# Patient Record
Sex: MALE | Race: WHITE | NOT HISPANIC OR LATINO | Employment: OTHER | URBAN - METROPOLITAN AREA
[De-identification: names, ages, dates, MRNs, and addresses within clinical notes are randomized per-mention and may not be internally consistent; named-entity substitution may affect disease eponyms.]

---

## 2019-01-02 ENCOUNTER — TELEPHONE (OUTPATIENT)
Dept: UROLOGY | Facility: CLINIC | Age: 74
End: 2019-01-02

## 2019-01-02 ENCOUNTER — OFFICE VISIT (OUTPATIENT)
Dept: UROLOGY | Facility: CLINIC | Age: 74
End: 2019-01-02
Payer: MEDICARE

## 2019-01-02 VITALS
SYSTOLIC BLOOD PRESSURE: 147 MMHG | WEIGHT: 207.25 LBS | DIASTOLIC BLOOD PRESSURE: 89 MMHG | HEART RATE: 76 BPM | BODY MASS INDEX: 27.47 KG/M2 | HEIGHT: 73 IN

## 2019-01-02 DIAGNOSIS — N13.8 BPH WITH URINARY OBSTRUCTION: Primary | ICD-10-CM

## 2019-01-02 DIAGNOSIS — N40.1 BPH WITH URINARY OBSTRUCTION: Primary | ICD-10-CM

## 2019-01-02 PROCEDURE — 99203 OFFICE O/P NEW LOW 30 MIN: CPT | Mod: PBBFAC | Performed by: UROLOGY

## 2019-01-02 PROCEDURE — 99204 OFFICE O/P NEW MOD 45 MIN: CPT | Mod: S$PBB,,, | Performed by: UROLOGY

## 2019-01-02 PROCEDURE — 99999 PR PBB SHADOW E&M-NEW PATIENT-LVL III: ICD-10-PCS | Mod: PBBFAC,,, | Performed by: UROLOGY

## 2019-01-02 PROCEDURE — 99204 PR OFFICE/OUTPT VISIT, NEW, LEVL IV, 45-59 MIN: ICD-10-PCS | Mod: S$PBB,,, | Performed by: UROLOGY

## 2019-01-02 PROCEDURE — 99999 PR PBB SHADOW E&M-NEW PATIENT-LVL III: CPT | Mod: PBBFAC,,, | Performed by: UROLOGY

## 2019-01-02 RX ORDER — WARFARIN SODIUM 5 MG/1
5 TABLET ORAL DAILY
Refills: 11 | COMMUNITY
Start: 2018-12-28

## 2019-01-02 RX ORDER — POTASSIUM CHLORIDE 600 MG/1
8 CAPSULE, EXTENDED RELEASE ORAL DAILY
Refills: 3 | COMMUNITY
Start: 2018-12-06

## 2019-01-02 RX ORDER — AMLODIPINE BESYLATE 2.5 MG/1
TABLET ORAL
Refills: 11 | Status: ON HOLD | COMMUNITY
Start: 2018-12-06 | End: 2019-07-16 | Stop reason: CLARIF

## 2019-01-02 RX ORDER — LIDOCAINE HYDROCHLORIDE 20 MG/ML
JELLY TOPICAL ONCE
Status: CANCELLED | OUTPATIENT
Start: 2019-01-02 | End: 2019-01-02

## 2019-01-02 RX ORDER — ASPIRIN 81 MG/1
81 TABLET ORAL DAILY
COMMUNITY

## 2019-01-02 RX ORDER — HYDRALAZINE HYDROCHLORIDE 25 MG/1
25 TABLET, FILM COATED ORAL EVERY 8 HOURS
Refills: 11 | COMMUNITY
Start: 2018-12-24 | End: 2019-10-21

## 2019-01-02 RX ORDER — DOCUSATE SODIUM 100 MG/1
100 CAPSULE, LIQUID FILLED ORAL 2 TIMES DAILY
COMMUNITY

## 2019-01-02 RX ORDER — FINASTERIDE 5 MG/1
TABLET, FILM COATED ORAL
Refills: 6 | COMMUNITY
Start: 2018-12-24 | End: 2019-07-02 | Stop reason: CLARIF

## 2019-01-02 RX ORDER — ATORVASTATIN CALCIUM 10 MG/1
10 TABLET, FILM COATED ORAL NIGHTLY
COMMUNITY

## 2019-01-02 RX ORDER — FUROSEMIDE 20 MG/1
TABLET ORAL
Refills: 11 | COMMUNITY
Start: 2018-12-03

## 2019-01-02 RX ORDER — ASPIRIN 325 MG
100 TABLET, DELAYED RELEASE (ENTERIC COATED) ORAL DAILY
COMMUNITY

## 2019-01-02 RX ORDER — WARFARIN 2.5 MG/1
7.5 TABLET ORAL
Refills: 11 | COMMUNITY
Start: 2018-12-28 | End: 2019-07-02 | Stop reason: CLARIF

## 2019-01-02 RX ORDER — TAMSULOSIN HYDROCHLORIDE 0.4 MG/1
0.4 CAPSULE ORAL NIGHTLY
Refills: 6 | COMMUNITY
Start: 2018-12-11

## 2019-01-02 NOTE — H&P (VIEW-ONLY)
CHIEF COMPLAINT:    Mr. Luna is a 73 y.o. male presenting with LUTS.    PRESENTING ILLNESS:    Lakeshia Luna is a 73 y.o. male who c/o LUTS. He has nocturia x 3-5, + straining, + decreased FOS, + intermittency, + hesitancy, + frequency.  He's on flomax and proscar.  His outside urologist recommended a TURP, but he'd like rezum.    He also c/o ED.  This has been present for > 1 year.  He's tried and failed oral meds, a MATHEW, and ICI.  He'd like his LUTS treated before considering IPP.    REVIEW OF SYSTEMS:    Lakeshia Luna denies headache, blurred vision, fever, nausea, vomiting, chills, abdominal pain, bleeding per rectum, cough, SOB, recent loss of consciousness, recent mental status changes, seizures, dizziness, or upper or lower extremity weakness.    KATIE  1. 1  2. 1  3. 1  4. 1  5. 1      PATIENT HISTORY:    Past Medical History:   Diagnosis Date    BPH with urinary obstruction     Hyperlipemia     Hypertension        Past Surgical History:   Procedure Laterality Date    BACK SURGERY      CARDIAC PACEMAKER PLACEMENT      HERNIA REPAIR      NECK SURGERY      VASECTOMY         History reviewed. No pertinent family history.    Social History     Socioeconomic History    Marital status:      Spouse name: Not on file    Number of children: Not on file    Years of education: Not on file    Highest education level: Not on file   Social Needs    Financial resource strain: Not on file    Food insecurity - worry: Not on file    Food insecurity - inability: Not on file    Transportation needs - medical: Not on file    Transportation needs - non-medical: Not on file   Occupational History    Not on file   Tobacco Use    Smoking status: Former Smoker     Types: Cigarettes    Smokeless tobacco: Former User   Substance and Sexual Activity    Alcohol use: Yes    Drug use: No    Sexual activity: Not on file   Other Topics Concern    Not on file   Social History Narrative    Not on file        Allergies:  Ace inhibitors and Carvedilol    Medications:    Current Outpatient Medications:     amLODIPine (NORVASC) 2.5 MG tablet, , Disp: , Rfl: 11    aspirin (ECOTRIN) 81 MG EC tablet, Take 81 mg by mouth once daily., Disp: , Rfl:     atorvastatin (LIPITOR) 10 MG tablet, Take 10 mg by mouth once daily., Disp: , Rfl:     co-enzyme Q-10 50 mg capsule, Take 100 mg by mouth once daily., Disp: , Rfl:     docusate sodium (COLACE) 100 MG capsule, Take 100 mg by mouth 2 (two) times daily., Disp: , Rfl:     finasteride (PROSCAR) 5 mg tablet, , Disp: , Rfl: 6    furosemide (LASIX) 20 MG tablet, , Disp: , Rfl: 11    hydrALAZINE (APRESOLINE) 25 MG tablet, , Disp: , Rfl: 11    potassium chloride (MICRO-K) 8 mEq CpSR, , Disp: , Rfl: 3    tamsulosin (FLOMAX) 0.4 mg Cap, , Disp: , Rfl: 6    warfarin (COUMADIN) 2.5 MG tablet, 7.5 mg every Mon, Wed, Fri. , Disp: , Rfl: 11    warfarin (COUMADIN) 5 MG tablet, , Disp: , Rfl: 11    PHYSICAL EXAMINATION:    The patient generally appears in good health, is appropriately interactive, and is in no apparent distress.     Eyes: anicteric sclerae, moist conjunctivae; no lid-lag; PERRLA     HENT: Atraumatic; oropharynx clear with moist mucous membranes and no mucosal ulcerations;normal hard and soft palate.  No evidence of lymphadenopathy.    Neck: Trachea midline.  No thyromegaly.    Musculoskeletal: No abnormal gait.    Skin: No lesions.    Mental: Cooperative with normal affect.  Is oriented to time, place, and person.    Neuro: Grossly intact.    Chest: Normal inspiratory effort.   No accessory muscles.  No audible wheezes.  Respirations symmetric on inspiration and expiration.    Heart: Regular rhythm.      Abdomen:  Soft, non-tender. No masses or organomegaly. Bladder is not palpable. No evidence of flank discomfort. No evidence of inguinal hernia.    Genitourinary: The penis is not circumcised with no evidence of plaques or induration. The urethral meatus is normal.  The testes, epididymides, and cord structures are normal in size and contour bilaterally. The scrotum is normal in size and contour.    Normal anal sphincter tone. No rectal mass.    The prostate is 40 g. Normal landmarks. Lateral sulci. Median furrow intact.  No nodularity or induration. Seminal vesicles are normal.    Extremities: No clubbing, cyanosis, or edema      LABS:    UA dipped negative today  No results found for: PSA, PSADIAG, PSATOTAL, PSAFREE, PSAFREEPCT    IMPRESSION:    Encounter Diagnoses   Name Primary?    BPH with urinary obstruction Yes         PLAN:    1. Discussed risks/benefits of Rezum.  Discussed bleeding, frequency, failure amongst other risks.  Also discussed very small risk of EjD and ED.  He was given the chance to ask questions.  Alternatives discussed.  Will schedule for Rezum.  2. Will cysto to make sure he's a candidate.  3. He'd like his ED worked up after the Rezum.    Copy to:

## 2019-01-03 ENCOUNTER — HOSPITAL ENCOUNTER (OUTPATIENT)
Dept: UROLOGY | Facility: HOSPITAL | Age: 74
Discharge: HOME | End: 2019-01-03
Attending: UROLOGY
Payer: MEDICARE

## 2019-01-03 VITALS
SYSTOLIC BLOOD PRESSURE: 172 MMHG | DIASTOLIC BLOOD PRESSURE: 80 MMHG | TEMPERATURE: 98 F | RESPIRATION RATE: 18 BRPM | WEIGHT: 211 LBS | HEIGHT: 73 IN | BODY MASS INDEX: 27.96 KG/M2 | HEART RATE: 75 BPM

## 2019-01-03 DIAGNOSIS — N13.8 BPH WITH URINARY OBSTRUCTION: ICD-10-CM

## 2019-01-03 DIAGNOSIS — N40.1 BPH WITH URINARY OBSTRUCTION: ICD-10-CM

## 2019-01-03 PROCEDURE — 52000 PR CYSTOURETHROSCOPY: ICD-10-PCS | Mod: ,,, | Performed by: UROLOGY

## 2019-01-03 PROCEDURE — 52000 CYSTOURETHROSCOPY: CPT

## 2019-01-03 PROCEDURE — 52000 CYSTOURETHROSCOPY: CPT | Mod: ,,, | Performed by: UROLOGY

## 2019-01-03 RX ORDER — LIDOCAINE HYDROCHLORIDE 20 MG/ML
JELLY TOPICAL ONCE
Status: COMPLETED | OUTPATIENT
Start: 2019-01-03 | End: 2019-01-03

## 2019-01-03 RX ADMIN — LIDOCAINE HYDROCHLORIDE: 20 JELLY TOPICAL at 01:01

## 2019-01-03 NOTE — PATIENT INSTRUCTIONS
What to Expect After a Cystoscopy  For the next 24-48 hours, you may feel a mild burning when you urinate. This burning is normal and expected. Drink plenty of water to dilute the urine to help relieve the burning sensation. You may also see a small amount of blood in your urine after the procedure.    Unless you are already taking antibiotics, you may be given an antibiotic after the test to prevent infection.    Signs and Symptoms to Report  Call the [facility] at [phone] if you develop any of the following:  · Fever of 101 degrees or higher  · Chills or persistent bleeding  · Inability to urinate

## 2019-01-03 NOTE — PROCEDURES
Date: 01/03/2019     Surgeon: Neville    Assistant: None    Procedure performed: cystoscopy    Blood loss: None    Specimen: None    Procedure in detail: Using standard sterile technique, the flexible cystoscope was assembled and passed into the patient's bladder.  Cystoscopic evaluation of the bladder revealed tri-lobar hypertrophy.  The estimated prostatic length was 4.5 cm.

## 2019-01-04 ENCOUNTER — ANESTHESIA EVENT (OUTPATIENT)
Dept: SURGERY | Facility: HOSPITAL | Age: 74
End: 2019-01-04
Payer: MEDICARE

## 2019-01-04 NOTE — PRE ADMISSION SCREENING
Anesthesia Assessment: Preoperative EQUATION    Planned Procedure: Procedure(s) (LRB):  DESTRUCTION, PROSTATE, TRANSURETHRAL (N/A)  Requested Anesthesia Type:Monitor Anesthesia Care  Surgeon: Fermin Lozada MD  Service: Urology  Known or anticipated Date of Surgery:1/22/2019    Surgeon notes: reviewed    Electronic QUestionnaire Assessment completed via nurse interview with patient.      NO AQ    Triage considerations:     The patient has no apparent active cardiac condition (No unstable coronary Syndrome such as severe unstable angina or recent [<1 month] myocardial infarction, decompensated CHF, severe valvular   disease or significant arrhythmia)    Previous anesthesia records:No problems and Not available   Has previously had ACDF 2015 and pacemaker with stents 8/5/2013    Last PCP note: outside [facility] , unknown, requesting records from [facility] PCP Dr. Mela Carter  Subspecialty notes: Cardiology: EP and General, Neurology (requesting Card records from [facility] Dr. Rios)    Other important co-morbidities: BPH, HTN/HLP, NIMA      Tests already available:  to be determined Requesting records from [facility] PCP and Card            Instructions given. (See in Nurse's note)    Optimization:  Anesthesia Preop Clinic Assessment  Indicated: Not required for this procedure.    Medical Opinion Indicated.  Requesting Card Clear from Dr. Rios      Plan:    Testing:  BMP and EKG (will order AM of Sx if not received; Pt lives out of town)     Consultation:Optimization request: TBCB OS Dr. Rios     Patient  has previously scheduled Medical Appointment: 4/17/2019 Post Op    Navigation: Tests Scheduled. (AM of surgery if not received)             Consults scheduled. (TBCB OS Dr. Rios)             Results will be tracked by Preop Clinic.

## 2019-01-04 NOTE — ANESTHESIA PREPROCEDURE EVALUATION
Bailey Blum, RN   Registered Nurse      Pre Admission Screening   Signed                     Anesthesia Assessment: Preoperative EQUATION     Planned Procedure: Procedure(s) (LRB):  DESTRUCTION, PROSTATE, TRANSURETHRAL (N/A)  Requested Anesthesia Type:Monitor Anesthesia Care  Surgeon: Fermin Lozada MD  Service: Urology  Known or anticipated Date of Surgery:1/22/2019     Surgeon notes: reviewed     Electronic QUestionnaire Assessment completed via nurse interview with patient.      NO AQ     Triage considerations:      The patient has no apparent active cardiac condition (No unstable coronary Syndrome such as severe unstable angina or recent [<1 month] myocardial infarction, decompensated CHF, severe valvular   disease or significant arrhythmia)     Previous anesthesia records:No problems and Not available   Has previously had ACDF 2015 and pacemaker with stents 8/5/2013     Last PCP note: outside [facility] , unknown, requesting records from [facility] PCP Dr. Mela Carter  Subspecialty notes: Cardiology: EP and General, Neurology (requesting Card records from [facility] Dr. Rios)     Other important co-morbidities: BPH, HTN/HLP, NIMA      Tests already available:  to be determined Requesting records from [facility] PCP and Card                            Instructions given. (See in Nurse's note)     Optimization:  Anesthesia Preop Clinic Assessment  Indicated: Not required for this procedure.    Medical Opinion Indicated.  Requesting Card Clear from Dr. Rios                Plan:    Testing:  BMP and EKG (will order AM of Sx if not received; Pt lives out of town)                           Consultation:Optimization request: TBCB OS Dr. Rios                           Patient  has previously scheduled Medical Appointment: 4/17/2019 Post Op     Navigation: Tests Scheduled. (AM of surgery if not received)                        Consults scheduled. (TBCB OS Dr. Rios)                        Results will be tracked by Preop  Clinic.          1/7/2019 Records received from Dr. Rios Cardiology, Visit note (10/11/2018), Echo (10/2/2018), EKG (8/21/2018), Stress Test (2/21/2018), Heart Cath diagram and note (3/2018).  Results noted and scanned to media.    1/9/2019  Received Cardiac Clearance note, scanned to media.  Waiting on Coumadin and ASA instructions.    1/15/2019  Surgery date changed to 1/22/2019.  Received ASA instructions from [facility] (scanned to media and pt verbally notified)  Medical Clearance and [facility] records (visit 1/8/2019) received from Dr. Carter. (scanned to media)  Received surgical clearance form from Dr. Goode with Pacemaker instructions.  (scanned to media) Still awaiting Coumadin Instructions.    BMP ordered for AM of surgery.    1/16/2019  OK to hold coumadin 5 days prior to surgery per Dr. Goode. Scanned to media                                                                                                                 01/04/2019  Lakeshia Luna is a 73 y.o., male.    Anesthesia Evaluation    I have reviewed the Patient Summary Reports.    I have reviewed the Nursing Notes.   I have reviewed the Medications.     Review of Systems  Anesthesia Hx:  History of prior surgery of interest to airway management or planning: Previous anesthesia: General 10/2015 ACDF with general anesthesia.  Denies Family Hx of Anesthesia complications.   Denies Personal Hx of Anesthesia complications.   Social:  Former Smoker, Social Alcohol Use Quit 2013   Hematology/Oncology:  Hematology Normal   Oncology Normal     EENT/Dental:  EENT/Dental Normal Denies Active Dental Problems  Denies Jaw Problems   Cardiovascular:   Hypertension Dysrhythmias atrial fibrillation Denies Angina. hyperlipidemia  Functional Capacity good / => 4 METS  Coronary Artery Disease:  hx of Percutaneous Coronary Intervention (PCI). S/P Percutaneous Coronary Intervention (PCI) coronary stent, unknown type, currently on aspirin, currently on other  antiplatelet Rx.  Hypertension , Well Controlled on Rx , Recent typical home B/P of 136/75 Disorder of Cardiac Rhythm, Atrial Fibrillation (Pacemaker 8/5/2013, paced at 75bpm), on warfarin Rx    Pulmonary:   Denies Asthma.  Denies Shortness of breath.  Denies Recent URI.  Obstructive Sleep Apnea (NIMA), CPAP used, CPAP prescribed.   Renal/:   BPH    Hepatic/GI:  Hepatic/GI Normal    Musculoskeletal:  Cervical Spine Disorder, S/P Cervical Fusion (ACDF 10/2015, Denies limited ROM)    Neurological:  Neurology Normal    Endocrine:  Endocrine Normal    Dermatological:  Skin Normal    Psych:  Psychiatric Normal           Physical Exam  General:  Well nourished    Airway/Jaw/Neck:  Airway Findings: Mouth Opening: Normal Tongue: Normal  General Airway Assessment: Adult  Mallampati: II  TM Distance: Normal, at least 6 cm  Jaw/Neck Findings:  Neck ROM: Normal ROM      Dental:  Dental Findings: In tact   Chest/Lungs:  Chest/Lungs Findings: Clear to auscultation, Normal Respiratory Rate     Heart/Vascular:  Heart Findings: Rate: Normal  Rhythm: Regular Rhythm  Sounds: Normal        Mental Status:  Mental Status Findings:  Cooperative, Alert and Oriented         Anesthesia Plan  Type of Anesthesia, risks & benefits discussed:  Anesthesia Type:  general, MAC  Patient's Preference:   Intra-op Monitoring Plan: standard ASA monitors  Intra-op Monitoring Plan Comments:   Post Op Pain Control Plan: multimodal analgesia  Post Op Pain Control Plan Comments:   Induction:   IV  Beta Blocker:  Patient is not currently on a Beta-Blocker (No further documentation required).       Informed Consent: Patient understands risks and agrees with Anesthesia plan.  Questions answered. Anesthesia consent signed with patient.  ASA Score: 2     Day of Surgery Review of History & Physical:    H&P update referred to the surgeon.         Ready For Surgery From Anesthesia Perspective.

## 2019-01-11 ENCOUNTER — TELEPHONE (OUTPATIENT)
Dept: UROLOGY | Facility: CLINIC | Age: 74
End: 2019-01-11

## 2019-01-15 DIAGNOSIS — Z01.818 PREOPERATIVE TESTING: Primary | ICD-10-CM

## 2019-01-21 ENCOUNTER — TELEPHONE (OUTPATIENT)
Dept: UROLOGY | Facility: CLINIC | Age: 74
End: 2019-01-21

## 2019-01-21 NOTE — TELEPHONE ENCOUNTER
Called pt to confirm arrival time 1000am for procedure. Gave pt NPO instructions and gave pt opportunity to ask questions. Pt verbalized understanding.

## 2019-01-22 ENCOUNTER — ANESTHESIA (OUTPATIENT)
Dept: SURGERY | Facility: HOSPITAL | Age: 74
End: 2019-01-22
Payer: MEDICARE

## 2019-01-22 ENCOUNTER — HOSPITAL ENCOUNTER (OUTPATIENT)
Facility: HOSPITAL | Age: 74
Discharge: HOME | End: 2019-01-22
Attending: UROLOGY | Admitting: UROLOGY
Payer: MEDICARE

## 2019-01-22 VITALS
TEMPERATURE: 98 F | RESPIRATION RATE: 16 BRPM | OXYGEN SATURATION: 100 % | BODY MASS INDEX: 26.64 KG/M2 | HEART RATE: 75 BPM | DIASTOLIC BLOOD PRESSURE: 93 MMHG | HEIGHT: 73 IN | WEIGHT: 201 LBS | SYSTOLIC BLOOD PRESSURE: 158 MMHG

## 2019-01-22 DIAGNOSIS — Z01.818 PREOPERATIVE TESTING: ICD-10-CM

## 2019-01-22 DIAGNOSIS — N40.0 BPH (BENIGN PROSTATIC HYPERPLASIA): Primary | ICD-10-CM

## 2019-01-22 LAB
ANION GAP SERPL CALC-SCNC: 8 MMOL/L
BUN SERPL-MCNC: 13 MG/DL
CALCIUM SERPL-MCNC: 9.7 MG/DL
CHLORIDE SERPL-SCNC: 102 MMOL/L
CO2 SERPL-SCNC: 30 MMOL/L
CREAT SERPL-MCNC: 1 MG/DL
EST. GFR  (AFRICAN AMERICAN): >60 ML/MIN/1.73 M^2
EST. GFR  (NON AFRICAN AMERICAN): >60 ML/MIN/1.73 M^2
GLUCOSE SERPL-MCNC: 104 MG/DL
POTASSIUM SERPL-SCNC: 3.8 MMOL/L
SODIUM SERPL-SCNC: 140 MMOL/L

## 2019-01-22 PROCEDURE — 71000015 HC POSTOP RECOV 1ST HR: Performed by: UROLOGY

## 2019-01-22 PROCEDURE — 37000008 HC ANESTHESIA 1ST 15 MINUTES: Performed by: UROLOGY

## 2019-01-22 PROCEDURE — 36000707: Performed by: UROLOGY

## 2019-01-22 PROCEDURE — 63600175 PHARM REV CODE 636 W HCPCS: Performed by: NURSE ANESTHETIST, CERTIFIED REGISTERED

## 2019-01-22 PROCEDURE — 25000003 PHARM REV CODE 250: Performed by: UROLOGY

## 2019-01-22 PROCEDURE — 25000003 PHARM REV CODE 250: Performed by: NURSE ANESTHETIST, CERTIFIED REGISTERED

## 2019-01-22 PROCEDURE — 37000009 HC ANESTHESIA EA ADD 15 MINS: Performed by: UROLOGY

## 2019-01-22 PROCEDURE — D9220A PRA ANESTHESIA: Mod: ANES,,, | Performed by: ANESTHESIOLOGY

## 2019-01-22 PROCEDURE — 36000706: Performed by: UROLOGY

## 2019-01-22 PROCEDURE — D9220A PRA ANESTHESIA: ICD-10-PCS | Mod: ANES,,, | Performed by: ANESTHESIOLOGY

## 2019-01-22 PROCEDURE — D9220A PRA ANESTHESIA: ICD-10-PCS | Mod: CRNA,,, | Performed by: NURSE ANESTHETIST, CERTIFIED REGISTERED

## 2019-01-22 PROCEDURE — 63600175 PHARM REV CODE 636 W HCPCS: Performed by: STUDENT IN AN ORGANIZED HEALTH CARE EDUCATION/TRAINING PROGRAM

## 2019-01-22 PROCEDURE — 80048 BASIC METABOLIC PNL TOTAL CA: CPT

## 2019-01-22 PROCEDURE — 53852 PR PROSTATIC RADIOFREQ THERMOTX: ICD-10-PCS | Mod: GC,,, | Performed by: UROLOGY

## 2019-01-22 PROCEDURE — 71000044 HC DOSC ROUTINE RECOVERY FIRST HOUR: Performed by: UROLOGY

## 2019-01-22 PROCEDURE — 53852 PROSTATIC RF THERMOTX: CPT | Mod: GC,,, | Performed by: UROLOGY

## 2019-01-22 PROCEDURE — D9220A PRA ANESTHESIA: Mod: CRNA,,, | Performed by: NURSE ANESTHETIST, CERTIFIED REGISTERED

## 2019-01-22 RX ORDER — LIDOCAINE HYDROCHLORIDE 20 MG/ML
JELLY TOPICAL
Status: DISCONTINUED | OUTPATIENT
Start: 2019-01-22 | End: 2019-01-22 | Stop reason: HOSPADM

## 2019-01-22 RX ORDER — CEFAZOLIN SODIUM 1 G/3ML
2 INJECTION, POWDER, FOR SOLUTION INTRAMUSCULAR; INTRAVENOUS
Status: COMPLETED | OUTPATIENT
Start: 2019-01-22 | End: 2019-01-22

## 2019-01-22 RX ORDER — PROPOFOL 10 MG/ML
VIAL (ML) INTRAVENOUS
Status: DISCONTINUED | OUTPATIENT
Start: 2019-01-22 | End: 2019-01-22

## 2019-01-22 RX ORDER — SODIUM CHLORIDE 0.9 % (FLUSH) 0.9 %
3 SYRINGE (ML) INJECTION
Status: DISCONTINUED | OUTPATIENT
Start: 2019-01-22 | End: 2019-01-22 | Stop reason: HOSPADM

## 2019-01-22 RX ORDER — IBUPROFEN 400 MG/1
400 TABLET ORAL EVERY 6 HOURS PRN
Qty: 48 TABLET | Refills: 0 | Status: SHIPPED | OUTPATIENT
Start: 2019-01-22 | End: 2019-02-05

## 2019-01-22 RX ORDER — MIDAZOLAM HYDROCHLORIDE 1 MG/ML
INJECTION, SOLUTION INTRAMUSCULAR; INTRAVENOUS
Status: DISCONTINUED | OUTPATIENT
Start: 2019-01-22 | End: 2019-01-22

## 2019-01-22 RX ORDER — ONDANSETRON 2 MG/ML
4 INJECTION INTRAMUSCULAR; INTRAVENOUS DAILY PRN
Status: DISCONTINUED | OUTPATIENT
Start: 2019-01-22 | End: 2019-01-22 | Stop reason: HOSPADM

## 2019-01-22 RX ORDER — HYDROCODONE BITARTRATE AND ACETAMINOPHEN 5; 325 MG/1; MG/1
1 TABLET ORAL EVERY 6 HOURS PRN
Qty: 11 TABLET | Refills: 0 | Status: SHIPPED | OUTPATIENT
Start: 2019-01-22 | End: 2019-02-01

## 2019-01-22 RX ORDER — PHENAZOPYRIDINE HYDROCHLORIDE 100 MG/1
100 TABLET, FILM COATED ORAL 3 TIMES DAILY PRN
Qty: 21 TABLET | Refills: 0 | Status: SHIPPED | OUTPATIENT
Start: 2019-01-22 | End: 2019-02-01

## 2019-01-22 RX ORDER — SULFAMETHOXAZOLE AND TRIMETHOPRIM 800; 160 MG/1; MG/1
1 TABLET ORAL 2 TIMES DAILY
Qty: 14 TABLET | Refills: 0 | Status: SHIPPED | OUTPATIENT
Start: 2019-01-22 | End: 2019-01-29

## 2019-01-22 RX ORDER — FENTANYL CITRATE 50 UG/ML
25 INJECTION, SOLUTION INTRAMUSCULAR; INTRAVENOUS EVERY 5 MIN PRN
Status: DISCONTINUED | OUTPATIENT
Start: 2019-01-22 | End: 2019-01-22 | Stop reason: HOSPADM

## 2019-01-22 RX ORDER — OXYBUTYNIN CHLORIDE 5 MG/1
5 TABLET ORAL 3 TIMES DAILY PRN
Qty: 21 TABLET | Refills: 0 | Status: SHIPPED | OUTPATIENT
Start: 2019-01-22 | End: 2019-07-02 | Stop reason: CLARIF

## 2019-01-22 RX ORDER — SODIUM CHLORIDE 9 MG/ML
INJECTION, SOLUTION INTRAVENOUS CONTINUOUS PRN
Status: DISCONTINUED | OUTPATIENT
Start: 2019-01-22 | End: 2019-01-22

## 2019-01-22 RX ORDER — LIDOCAINE HCL/PF 100 MG/5ML
SYRINGE (ML) INTRAVENOUS
Status: DISCONTINUED | OUTPATIENT
Start: 2019-01-22 | End: 2019-01-22

## 2019-01-22 RX ADMIN — PROPOFOL 20 MG: 10 INJECTION, EMULSION INTRAVENOUS at 11:01

## 2019-01-22 RX ADMIN — PROPOFOL 150 MG: 10 INJECTION, EMULSION INTRAVENOUS at 11:01

## 2019-01-22 RX ADMIN — MIDAZOLAM HYDROCHLORIDE 1 MG: 1 INJECTION, SOLUTION INTRAMUSCULAR; INTRAVENOUS at 10:01

## 2019-01-22 RX ADMIN — SODIUM CHLORIDE: 0.9 INJECTION, SOLUTION INTRAVENOUS at 10:01

## 2019-01-22 RX ADMIN — LIDOCAINE HYDROCHLORIDE 100 MG: 20 INJECTION, SOLUTION INTRAVENOUS at 11:01

## 2019-01-22 RX ADMIN — CEFAZOLIN 2 G: 330 INJECTION, POWDER, FOR SOLUTION INTRAMUSCULAR; INTRAVENOUS at 11:01

## 2019-01-22 RX ADMIN — PROPOFOL 30 MG: 10 INJECTION, EMULSION INTRAVENOUS at 11:01

## 2019-01-22 NOTE — DISCHARGE INSTRUCTIONS
Please continue taking your flomax.   May restart your aspirin tomorrow (1/23)  May restart your coumadin on Thursday (1/24) if urine is not bloody.   Will have you follow up in 1 week for catheter removal.         Resendez Catheter Care    A Resendez catheter is a rubber tube that is placed through the urethra (opening where urine comes out) and into the bladder. This helps drain urine from the bladder. There is a small balloon on the end of the tube that is inflated after insertion. This keeps the catheter from sliding out of the bladder.  A Resendez catheter is used to treat urinary retention (unable to pass urine). It is also used when there is incontinence (loss of bladder control).  Home care  · Finish taking any prescribed antibiotic even if you are feeling better before then.  · It is important to keep bacteria from getting into the collection bag. Do not disconnect the catheter from the collection bag.  · Use a leg band to secure the drainage tube, so it does not pull on the catheter. Drain the collection bag when it becomes full using the drain spout at the bottom of the bag.  · Do not try to pull or remove your catheter. This will injure your urethra. It must be removed by your healthcare provider or nurse.  Follow-up care  Follow up with your healthcare provider as advised for repeat urine testing and catheter removal or replacement.  When to seek medical advice  Call your healthcare provider right away if any of these occur:  · Fever of 100.4ºF (38ºC) or higher, or as directed by your healthcare provider  · Bladder pain or fullness  · Abdominal swelling, nausea or vomiting, or back pain  · Blood or urine leakage around the catheter  · Bloody urine coming from the catheter (if a new symptom)  · Catheter falls out  · Catheter stops draining for 6 hours  · Weakness, dizziness, or fainting  Date Last Reviewed: 10/1/2016  © 1175-6407 [employer]. [address] [place] [address], [place] [address]. All rights  reserved. This information is not intended as a substitute for professional medical care. Always follow your healthcare professional's instructions.

## 2019-01-22 NOTE — OP NOTE
[facility]  Operative Note     Date: 01/22/2019     Pre-Op Diagnosis: BPH with obstructive urinary symptoms    Patient Active Problem List   Diagnosis    BPH (benign prostatic hyperplasia)    Hypertension    Hyperlipemia    Atrial fibrillation        Post-Op Diagnosis: same     Procedure(s) Performed:   1. Transurethral destruction of prostate; radiofrequency thermotherapy      Specimen(s): none     Staff Surgeon: Fermin Lozada MD     Assistant Surgeon: Matthieu Allison MD     Anesthesia:  General LMA anesthesia     Indications: Lakeshia Luna is a 74 y.o. male with BPH and LUTS presenting for surgical intervention.         Findings:    1. Trilobar hyperplasia  2.  6 total treatments, 2 in each lateral lobe, 2 in median lobe     Estimated Blood Loss: minimal     Drains:   1.  18 Fr  catheter     Procedure in detail: After informed consent was obtained and all questions were answered, the patient was brought to the operating suite and placed in the lithotomy position. General anesthesia was administered. SCDs were applied and working prior to induction of anesthesia. That patient was then prepped and draped in the usual sterile fashion. Time out was performed and preoperative antibiotics were confirmed.       We began the case by inserting the Rezum scope into the bladder. No urethral strictures were seen and scope entered easily.     Next, we examined the prostate and found trilobar hypertrophy. RF was then used to create thermal energy to selectively ablate prostate tissue 1 cm from the bladder neck to the proximal end of the veru spaced 1 cm apart.      6 total treatments were given. Specifically  2 in each lateral lobe at 3 and 6 o'clock positions and 2 in his median lobe. There was concern for vapor leak on our first treatment on the left portion of the median lobe and we decided to perform another treatment in this area.      At the completion of the procedure the device was removed. There  was a careful check that there were no clots in the bladder or injury to the bladder, prostate or urethra.      18 fr  catheter was placed with 10 mL of sterile water in the balloon     The patient tolerated the procedure well and was transferred to the PACU in stable condition.       Disposition:  The patient will be discharged home with 5 days of antibiotic therapy, 1 week of pyridium, 1 week of ditropan, 2 weeks of NSAIDS, and pain medications. He will follow up with Dr. Lozada in clinic in 7 days to have his  catheter removed.

## 2019-01-22 NOTE — DISCHARGE SUMMARY
[facility]  Discharge Note  Short Stay    Admit Date: 1/22/2019    Discharge Date and Time: 01/22/2019 11:38 AM      Attending Physician: Fermin Lozada MD     Discharge Provider: Matthieu Allison MD    Diagnoses:  Active Hospital Problems    Diagnosis  POA    *BPH (benign prostatic hyperplasia) [N40.0]  Yes    Hypertension [I10]  Yes    Hyperlipemia [E78.5]  Yes    Atrial fibrillation [I48.91]  Yes      Resolved Hospital Problems   No resolved problems to display.       Discharged Condition: good    Hospital Course: Patient was admitted for Rezum and tolerated the procedure well with no complications. The patient was discharged home in good condition on the same day.       Final Diagnoses: Same as principal problem.    Disposition: Home or Self Care    Follow up/Patient Instructions:    Medications:  Reconciled Home Medications:   Current Discharge Medication List      START taking these medications    Details   HYDROcodone-acetaminophen (NORCO) 5-325 mg per tablet Take 1 tablet by mouth every 6 (six) hours as needed for Pain.  Qty: 11 tablet, Refills: 0      ibuprofen (ADVIL,MOTRIN) 400 MG tablet Take 1 tablet (400 mg total) by mouth every 6 (six) hours as needed (pain and inflammation).  Qty: 48 tablet, Refills: 0      oxybutynin (DITROPAN) 5 MG Tab Take 1 tablet (5 mg total) by mouth 3 (three) times daily as needed (bladder spasms).  Qty: 21 tablet, Refills: 0      phenazopyridine (PYRIDIUM) 100 MG tablet Take 1 tablet (100 mg total) by mouth 3 (three) times daily as needed for Pain.  Qty: 21 tablet, Refills: 0      sulfamethoxazole-trimethoprim 800-160mg (BACTRIM DS) 800-160 mg Tab Take 1 tablet by mouth 2 (two) times daily. for 7 days  Qty: 14 tablet, Refills: 0         CONTINUE these medications which have NOT CHANGED    Details   amLODIPine (NORVASC) 2.5 MG tablet Refills: 11      aspirin (ECOTRIN) 81 MG EC tablet Take 81 mg by mouth once daily.      atorvastatin (LIPITOR) 10 MG tablet Take 10  mg by mouth once daily.      co-enzyme Q-10 50 mg capsule Take 100 mg by mouth once daily.      docusate sodium (COLACE) 100 MG capsule Take 100 mg by mouth 2 (two) times daily.      finasteride (PROSCAR) 5 mg tablet Refills: 6      furosemide (LASIX) 20 MG tablet Refills: 11      hydrALAZINE (APRESOLINE) 25 MG tablet Refills: 11      potassium chloride (MICRO-K) 8 mEq CpSR Refills: 3      tamsulosin (FLOMAX) 0.4 mg Cap Refills: 6      !! warfarin (COUMADIN) 2.5 MG tablet 7.5 mg every Mon, Wed, Fri.   Refills: 11      !! warfarin (COUMADIN) 5 MG tablet Refills: 11       !! - Potential duplicate medications found. Please discuss with provider.        Discharge Procedure Orders   Call MD for:  persistent nausea and vomiting or diarrhea     Call MD for:  severe uncontrolled pain     Call MD for:  persistent dizziness, light-headedness, or visual disturbances     Call MD for:   Order Comments: Temperature > 101F     Follow-up Information     [facility] 4th Floor In 1 week.    Specialty:  Urology  Why:  for  catheter removal  Contact information:  [address] [facility] [address] [phone]  Additional information:  [facility] - 4th Floor           Fermin Lozada MD In 3 months.    Specialty:  Urology  Contact information:  [address]  [facility] [address] [phone]                   Discharge Procedure Orders (must include Diet, Follow-up, Activity):   Discharge Procedure Orders (must include Diet, Follow-up, Activity)   Call MD for:  persistent nausea and vomiting or diarrhea     Call MD for:  severe uncontrolled pain     Call MD for:  persistent dizziness, light-headedness, or visual disturbances     Call MD for:   Order Comments: Temperature > 101F

## 2019-01-22 NOTE — ANESTHESIA POSTPROCEDURE EVALUATION
"Anesthesia Post Evaluation    Patient: Lakeshia Luna    Procedure(s) Performed: Procedure(s) (LRB):  DESTRUCTION, PROSTATE, TRANSURETHRAL (N/A)    Final Anesthesia Type: general  Patient location during evaluation: PACU  Patient participation: Yes- Able to Participate  Level of consciousness: awake and alert  Post-procedure vital signs: reviewed and stable  Pain management: adequate  Airway patency: patent  PONV status at discharge: No PONV  Anesthetic complications: no      Cardiovascular status: blood pressure returned to baseline  Respiratory status: unassisted, spontaneous ventilation and room air  Hydration status: euvolemic  Follow-up not needed.        Visit Vitals  /79   Pulse 75   Temp 36.6 °C (97.9 °F) (Temporal)   Resp 17   Ht 6' 1" (1.854 m)   Wt 91.2 kg (201 lb)   SpO2 98%   BMI 26.52 kg/m²       Pain/Ronal Score: Ronal Score: 9 (1/22/2019 11:32 AM)        "

## 2019-01-22 NOTE — PLAN OF CARE
Discharge instructions reviewed w/ pt and friend, verbalized understanding. Pt in NADN.No complaints at this time. Resendez care given, verbalized understanding. To be d//cd home w/ friend.

## 2019-01-22 NOTE — INTERVAL H&P NOTE
The patient has been examined and the H&P has been reviewed:    I concur with the findings and no changes have occurred since H&P was written.     Stopped ASA 1 week ago. Stopped coumadin 5 days ago.     Anesthesia/Surgery risks, benefits and alternative options discussed and understood by patient/family.          Active Hospital Problems    Diagnosis  POA    BPH (benign prostatic hyperplasia) [N40.0]  Yes      Resolved Hospital Problems   No resolved problems to display.

## 2019-01-22 NOTE — PROGRESS NOTES
Pt has a pacemaker and is having surgery below the waist. Nothing needs to be done to device for surgery.

## 2019-02-01 ENCOUNTER — OFFICE VISIT (OUTPATIENT)
Dept: UROLOGY | Facility: CLINIC | Age: 74
End: 2019-02-01
Payer: MEDICARE

## 2019-02-01 VITALS
BODY MASS INDEX: 27.7 KG/M2 | DIASTOLIC BLOOD PRESSURE: 76 MMHG | SYSTOLIC BLOOD PRESSURE: 133 MMHG | WEIGHT: 209 LBS | HEIGHT: 73 IN | TEMPERATURE: 98 F | HEART RATE: 87 BPM

## 2019-02-01 DIAGNOSIS — Z97.8 FOLEY CATHETER IN PLACE: ICD-10-CM

## 2019-02-01 DIAGNOSIS — N13.8 BPH WITH URINARY OBSTRUCTION: ICD-10-CM

## 2019-02-01 DIAGNOSIS — Z98.890 POST-OPERATIVE STATE: Primary | ICD-10-CM

## 2019-02-01 DIAGNOSIS — Z46.6 ENCOUNTER FOR FOLEY CATHETER REMOVAL: ICD-10-CM

## 2019-02-01 DIAGNOSIS — N40.1 BPH WITH URINARY OBSTRUCTION: ICD-10-CM

## 2019-02-01 PROCEDURE — 99214 OFFICE O/P EST MOD 30 MIN: CPT | Mod: PBBFAC | Performed by: NURSE PRACTITIONER

## 2019-02-01 PROCEDURE — 99999 PR PBB SHADOW E&M-EST. PATIENT-LVL IV: CPT | Mod: PBBFAC,,, | Performed by: NURSE PRACTITIONER

## 2019-02-01 PROCEDURE — 99024 POSTOP FOLLOW-UP VISIT: CPT | Mod: POP,,, | Performed by: NURSE PRACTITIONER

## 2019-02-01 PROCEDURE — 99024 PR POST-OP FOLLOW-UP VISIT: ICD-10-PCS | Mod: POP,,, | Performed by: NURSE PRACTITIONER

## 2019-02-01 PROCEDURE — 99999 PR PBB SHADOW E&M-EST. PATIENT-LVL IV: ICD-10-PCS | Mod: PBBFAC,,, | Performed by: NURSE PRACTITIONER

## 2019-02-01 NOTE — PROGRESS NOTES
Subjective:       Patient ID: Lakeshia Luna is a 74 y.o. male.    Chief Complaint: Post-op Evaluation (s/p REZUM;  removal)    Lakeshia Luna is a 74 y.o. male with BPH and LUTS presenting for surgical intervention with Dr. Lozada.       01/22/2019 Procedure(s) Performed:   1. Transurethral destruction of prostate; radiofrequency thermotherapy    Findings:    1. Trilobar hyperplasia  2.  6 total treatments, 2 in each lateral lobe, 2 in median lobe      He is here today for  removal.  He states everything is going well.  Denies bladder pain/spasms.  Eating and drinking well.                Past Medical History:  No date: Atrial fibrillation  No date: BPH with urinary obstruction  No date: Hyperlipemia  No date: Hypertension    Past Surgical History:  No date: BACK SURGERY  No date: CARDIAC PACEMAKER PLACEMENT  1/22/2019: DESTRUCTION, PROSTATE, TRANSURETHRAL; N/A      Comment:  Performed by Fermin Lozada MD at [facility] OR [address] [place] [address]  No date: HERNIA REPAIR  No date: NECK SURGERY  No date: VASECTOMY    No family history on file.      Social History    Socioeconomic History      Marital status:       Spouse name: Not on file      Number of children: Not on file      Years of education: Not on file      Highest education level: Not on file    Social Needs      Financial resource strain: Not on file      Food insecurity - worry: Not on file      Food insecurity - inability: Not on file      Transportation needs - medical: Not on file      Transportation needs - non-medical: Not on file    Occupational History      Not on file    Tobacco Use      Smoking status: Former Smoker        Types: Cigarettes      Smokeless tobacco: Former User    Substance and Sexual Activity      Alcohol use: Yes      Drug use: No      Sexual activity: Not on file    Other Topics      Concerns:        Not on file    Social History Narrative      Not on file      Allergies:  Ace inhibitors and Carvedilol    Medications:  Current Outpatient  Medications:   amLODIPine (NORVASC) 2.5 MG tablet, , Disp: , Rfl: 11  aspirin (ECOTRIN) 81 MG EC tablet, Take 81 mg by mouth once daily., Disp: , Rfl:   atorvastatin (LIPITOR) 10 MG tablet, Take 10 mg by mouth once daily., Disp: , Rfl:   co-enzyme Q-10 50 mg capsule, Take 100 mg by mouth once daily., Disp: , Rfl:   docusate sodium (COLACE) 100 MG capsule, Take 100 mg by mouth 2 (two) times daily., Disp: , Rfl:   finasteride (PROSCAR) 5 mg tablet, , Disp: , Rfl: 6  furosemide (LASIX) 20 MG tablet, , Disp: , Rfl: 11  hydrALAZINE (APRESOLINE) 25 MG tablet, , Disp: , Rfl: 11  HYDROcodone-acetaminophen (NORCO) 5-325 mg per tablet, Take 1 tablet by mouth every 6 (six) hours as needed for Pain., Disp: 11 tablet, Rfl: 0  ibuprofen (ADVIL,MOTRIN) 400 MG tablet, Take 1 tablet (400 mg total) by mouth every 6 (six) hours as needed (pain and inflammation)., Disp: 48 tablet, Rfl: 0  phenazopyridine (PYRIDIUM) 100 MG tablet, Take 1 tablet (100 mg total) by mouth 3 (three) times daily as needed for Pain., Disp: 21 tablet, Rfl: 0  potassium chloride (MICRO-K) 8 mEq CpSR, , Disp: , Rfl: 3  tamsulosin (FLOMAX) 0.4 mg Cap, , Disp: , Rfl: 6  warfarin (COUMADIN) 2.5 MG tablet, 7.5 mg every Mon, Wed, Fri. , Disp: , Rfl: 11  warfarin (COUMADIN) 5 MG tablet, , Disp: , Rfl: 11  oxybutynin (DITROPAN) 5 MG Tab, Take 1 tablet (5 mg total) by mouth 3 (three) times daily as needed (bladder spasms)., Disp: 21 tablet, Rfl: 0                Review of Systems   Constitutional: Negative for chills and fever.   Respiratory: Negative.  Negative for chest tightness and shortness of breath.    Cardiovascular: Negative.  Negative for chest pain.   Gastrointestinal: Negative.    Genitourinary:        Resendez draining well  Denies any extreme LUTS;   No spasms;  Resendez is just aggravating.          Objective:      Physical Exam   Nursing note and vitals reviewed.  Constitutional: He is oriented to person, place, and time. Vital signs are normal.  He appears well-developed and well-nourished. He is cooperative.  Non-toxic appearance. He does not have a sickly appearance.   HENT:   Head: Normocephalic and atraumatic.   Right Ear: External ear normal.   Left Ear: External ear normal.   Nose: Nose normal.   Mouth/Throat: Mucous membranes are normal.   Eyes: Conjunctivae and lids are normal. No scleral icterus.   Neck: Trachea normal, normal range of motion and full passive range of motion without pain. Neck supple. No JVD present. No tracheal deviation present.   Cardiovascular: Normal rate, S1 normal and S2 normal.    Pulmonary/Chest: Effort normal. No respiratory distress. He exhibits no tenderness.   Abdominal: Soft. Normal appearance and bowel sounds are normal. There is no hepatosplenomegaly. There is no tenderness. There is no CVA tenderness.   Genitourinary: Testes normal and penis normal.   Musculoskeletal: Normal range of motion.   Neurological: He is alert and oriented to person, place, and time. He has normal strength.   Skin: Skin is warm, dry and intact.     Psychiatric: He has a normal mood and affect. His behavior is normal. Judgment and thought content normal.       Assessment:       1. Post-operative state    2. Encounter for  catheter removal    3.  catheter in place    4. BPH with urinary obstruction        Plan:         I spent 20 minutes with the patient of which more than half was spent in direct consultation with the patient in regards to our treatment and plan.    Education and recommendations of today's plan of care including home remedies.  VT passed in the office (300 in, 240 out; Bladder Scan showed 28 cc in bladder)  We discussed the post Rezume expectations, as well as the post  removal expectations.  He has OAB meds;   We discussed CIC if unable to urinate; 14fr single use cath given; reviewed instructions.  Can use if retention at night, do CIC then go to ER.  Discussed diet modifications avoiding bladder  irritants.  RTC PRN and as scheduled.

## 2019-04-15 ENCOUNTER — TELEPHONE (OUTPATIENT)
Dept: UROLOGY | Facility: CLINIC | Age: 74
End: 2019-04-15

## 2019-05-16 ENCOUNTER — OFFICE VISIT (OUTPATIENT)
Dept: UROLOGY | Facility: CLINIC | Age: 74
End: 2019-05-16
Payer: MEDICARE

## 2019-05-16 VITALS
BODY MASS INDEX: 28.08 KG/M2 | DIASTOLIC BLOOD PRESSURE: 91 MMHG | HEIGHT: 73 IN | SYSTOLIC BLOOD PRESSURE: 154 MMHG | HEART RATE: 54 BPM | WEIGHT: 211.88 LBS

## 2019-05-16 DIAGNOSIS — N40.1 BPH WITH URINARY OBSTRUCTION: Primary | ICD-10-CM

## 2019-05-16 DIAGNOSIS — N13.8 BPH WITH URINARY OBSTRUCTION: Primary | ICD-10-CM

## 2019-05-16 DIAGNOSIS — N52.01 ERECTILE DYSFUNCTION DUE TO ARTERIAL INSUFFICIENCY: ICD-10-CM

## 2019-05-16 DIAGNOSIS — E78.5 HYPERLIPIDEMIA, UNSPECIFIED HYPERLIPIDEMIA TYPE: ICD-10-CM

## 2019-05-16 DIAGNOSIS — I10 HYPERTENSION, UNSPECIFIED TYPE: ICD-10-CM

## 2019-05-16 DIAGNOSIS — R33.9 URINARY RETENTION: ICD-10-CM

## 2019-05-16 PROCEDURE — 99214 OFFICE O/P EST MOD 30 MIN: CPT | Mod: S$PBB,,, | Performed by: UROLOGY

## 2019-05-16 PROCEDURE — 99213 OFFICE O/P EST LOW 20 MIN: CPT | Mod: PBBFAC | Performed by: UROLOGY

## 2019-05-16 PROCEDURE — 99999 PR PBB SHADOW E&M-EST. PATIENT-LVL III: CPT | Mod: PBBFAC,,, | Performed by: UROLOGY

## 2019-05-16 PROCEDURE — 99214 PR OFFICE/OUTPT VISIT, EST, LEVL IV, 30-39 MIN: ICD-10-PCS | Mod: S$PBB,,, | Performed by: UROLOGY

## 2019-05-16 PROCEDURE — 99999 PR PBB SHADOW E&M-EST. PATIENT-LVL III: ICD-10-PCS | Mod: PBBFAC,,, | Performed by: UROLOGY

## 2019-05-16 RX ORDER — CYCLOBENZAPRINE HCL 10 MG
TABLET ORAL
Refills: 0 | COMMUNITY
Start: 2019-02-07 | End: 2019-07-02 | Stop reason: CLARIF

## 2019-05-16 RX ORDER — TRAMADOL HYDROCHLORIDE 50 MG/1
TABLET ORAL
Refills: 0 | COMMUNITY
Start: 2019-02-07 | End: 2019-07-02 | Stop reason: CLARIF

## 2019-05-16 RX ORDER — AMLODIPINE BESYLATE 2.5 MG/1
TABLET ORAL
COMMUNITY

## 2019-05-16 NOTE — PROGRESS NOTES
CHIEF COMPLAINT:    Mr. Luna is a 74 y.o. male presenting with LUTS.    PRESENTING ILLNESS:    Lakeshia Luna is a 74 y.o. male who c/o LUTS.   He's s/p rezum on 1/22/19.  He is voiding better, but it is not as good as he would like. He has improved FOS.  However, he still has frequency and intermittency.  He's on flomax.      He also c/o ED.  This has been present for > 1 year.  He's tried and failed oral meds, a MATHEW, and ICI.  He'd like his LUTS treated before considering IPP.    REVIEW OF SYSTEMS:    Lakeshia Luna denies headache, blurred vision, fever, nausea, vomiting, chills, abdominal pain, bleeding per rectum, cough, SOB, recent loss of consciousness, recent mental status changes, seizures, dizziness, or upper or lower extremity weakness.    KATIE  1. 1  2. 1  3. 1  4. 1  5. 1      PATIENT HISTORY:    Past Medical History:   Diagnosis Date    Atrial fibrillation     BPH with urinary obstruction     Hyperlipemia     Hypertension        Past Surgical History:   Procedure Laterality Date    BACK SURGERY      CARDIAC PACEMAKER PLACEMENT      DESTRUCTION, PROSTATE, TRANSURETHRAL N/A 1/22/2019    Performed by Fermin Lozada MD at [facility] OR [address] [place] [address]    HERNIA REPAIR      NECK SURGERY      VASECTOMY         History reviewed. No pertinent family history.    Social History     Socioeconomic History    Marital status:      Spouse name: Not on file    Number of children: Not on file    Years of education: Not on file    Highest education level: Not on file   Occupational History    Not on file   Social Needs    Financial resource strain: Not on file    Food insecurity:     Worry: Not on file     Inability: Not on file    Transportation needs:     Medical: Not on file     Non-medical: Not on file   Tobacco Use    Smoking status: Former Smoker     Types: Cigarettes    Smokeless tobacco: Former User   Substance and Sexual Activity    Alcohol use: Yes    Drug use: No    Sexual activity: Not on file    Lifestyle    Physical activity:     Days per week: Not on file     Minutes per session: Not on file    Stress: Not on file   Relationships    Social connections:     Talks on phone: Not on file     Gets together: Not on file     Attends Confucianist service: Not on file     Active member of club or organization: Not on file     Attends meetings of clubs or organizations: Not on file     Relationship status: Not on file   Other Topics Concern    Not on file   Social History Narrative    Not on file       Allergies:  Ace inhibitors and Carvedilol    Medications:    Current Outpatient Medications:     amLODIPine (NORVASC) 2.5 MG tablet, , Disp: , Rfl: 11    amLODIPine (NORVASC) 2.5 MG tablet, Norvasc 2.5 mg tablet  Take 1 tablet every day by oral route., Disp: , Rfl:     aspirin (ECOTRIN) 81 MG EC tablet, Take 81 mg by mouth once daily., Disp: , Rfl:     atorvastatin (LIPITOR) 10 MG tablet, Take 10 mg by mouth once daily., Disp: , Rfl:     co-enzyme Q-10 50 mg capsule, Take 100 mg by mouth once daily., Disp: , Rfl:     cyclobenzaprine (FLEXERIL) 10 MG tablet, , Disp: , Rfl: 0    docusate sodium (COLACE) 100 MG capsule, Take 100 mg by mouth 2 (two) times daily., Disp: , Rfl:     finasteride (PROSCAR) 5 mg tablet, , Disp: , Rfl: 6    furosemide (LASIX) 20 MG tablet, , Disp: , Rfl: 11    hydrALAZINE (APRESOLINE) 25 MG tablet, , Disp: , Rfl: 11    potassium chloride (MICRO-K) 8 mEq CpSR, , Disp: , Rfl: 3    tamsulosin (FLOMAX) 0.4 mg Cap, , Disp: , Rfl: 6    traMADol (ULTRAM) 50 mg tablet, , Disp: , Rfl: 0    warfarin (COUMADIN) 2.5 MG tablet, 7.5 mg every Mon, Wed, Fri. , Disp: , Rfl: 11    warfarin (COUMADIN) 5 MG tablet, , Disp: , Rfl: 11    oxybutynin (DITROPAN) 5 MG Tab, Take 1 tablet (5 mg total) by mouth 3 (three) times daily as needed (bladder spasms)., Disp: 21 tablet, Rfl: 0    PHYSICAL EXAMINATION:    The patient generally appears in good health, is appropriately interactive, and is in no  apparent distress.     Eyes: anicteric sclerae, moist conjunctivae; no lid-lag; PERRLA     HENT: Atraumatic; oropharynx clear with moist mucous membranes and no mucosal ulcerations;normal hard and soft palate.  No evidence of lymphadenopathy.    Neck: Trachea midline.  No thyromegaly.    Musculoskeletal: No abnormal gait.    Skin: No lesions.    Mental: Cooperative with normal affect.  Is oriented to time, place, and person.    Neuro: Grossly intact.    Chest: Normal inspiratory effort.   No accessory muscles.  No audible wheezes.  Respirations symmetric on inspiration and expiration.    Heart: Regular rhythm.      Abdomen:  Soft, non-tender. No masses or organomegaly. Bladder is not palpable. No evidence of flank discomfort. No evidence of inguinal hernia.    Genitourinary: The penis is not circumcised with no evidence of plaques or induration. The urethral meatus is normal. The testes, epididymides, and cord structures are normal in size and contour bilaterally. The scrotum is normal in size and contour.    Normal anal sphincter tone. No rectal mass.    The prostate is 40 g. Normal landmarks. Lateral sulci. Median furrow intact.  No nodularity or induration. Seminal vesicles are normal.    Extremities: No clubbing, cyanosis, or edema      LABS:    UA dipped negative today  PVR done immediately after voiding by my nurse is 227 cc.   No results found for: PSA, PSADIAG, PSATOTAL, PSAFREE, PSAFREEPCT    IMPRESSION:    Encounter Diagnoses   Name Primary?    BPH with urinary obstruction Yes    Urinary retention     Erectile dysfunction due to arterial insufficiency     Hypertension, unspecified type     Hyperlipidemia, unspecified hyperlipidemia type      HTN, controlled  Hyperlipidemia, controlled    PLAN:    1. Discussed that his PVR is still high.  Recommend SUDS/cysto.  2. He'd like his ED worked up after the Rezu.    Copy to:

## 2019-06-21 ENCOUNTER — DOCUMENTATION ONLY (OUTPATIENT)
Dept: UROLOGY | Facility: CLINIC | Age: 74
End: 2019-06-21

## 2019-06-21 ENCOUNTER — TELEPHONE (OUTPATIENT)
Dept: UROLOGY | Facility: CLINIC | Age: 74
End: 2019-06-21

## 2019-06-21 ENCOUNTER — PROCEDURE VISIT (OUTPATIENT)
Dept: UROLOGY | Facility: CLINIC | Age: 74
End: 2019-06-21
Payer: MEDICARE

## 2019-06-21 VITALS
WEIGHT: 208 LBS | BODY MASS INDEX: 27.57 KG/M2 | SYSTOLIC BLOOD PRESSURE: 175 MMHG | DIASTOLIC BLOOD PRESSURE: 102 MMHG | HEART RATE: 85 BPM | TEMPERATURE: 98 F | HEIGHT: 73 IN

## 2019-06-21 DIAGNOSIS — N40.1 BPH WITH URINARY OBSTRUCTION: Primary | ICD-10-CM

## 2019-06-21 DIAGNOSIS — N13.8 BPH WITH URINARY OBSTRUCTION: Primary | ICD-10-CM

## 2019-06-21 DIAGNOSIS — R33.9 URINARY RETENTION: ICD-10-CM

## 2019-06-21 PROCEDURE — 51728 CYSTOMETROGRAM W/VP: CPT | Mod: 26,S$PBB,51, | Performed by: UROLOGY

## 2019-06-21 PROCEDURE — 51784 ANAL/URINARY MUSCLE STUDY: CPT | Mod: PBBFAC | Performed by: UROLOGY

## 2019-06-21 PROCEDURE — 51797 INTRAABDOMINAL PRESSURE TEST: CPT | Mod: 26,S$PBB,51, | Performed by: UROLOGY

## 2019-06-21 PROCEDURE — 52000 PR CYSTOURETHROSCOPY: ICD-10-PCS | Mod: S$PBB,51,, | Performed by: UROLOGY

## 2019-06-21 PROCEDURE — 51728 CYSTOMETROGRAM W/VP: CPT | Mod: PBBFAC | Performed by: UROLOGY

## 2019-06-21 PROCEDURE — 52000 CYSTOURETHROSCOPY: CPT | Mod: PBBFAC

## 2019-06-21 PROCEDURE — 51741 ELECTRO-UROFLOWMETRY FIRST: CPT | Mod: 26,S$PBB,51, | Performed by: UROLOGY

## 2019-06-21 PROCEDURE — 51784 ANAL/URINARY MUSCLE STUDY: CPT | Mod: 26,S$PBB,51, | Performed by: UROLOGY

## 2019-06-21 PROCEDURE — 51797 PR VOIDING PRESS STUDY INTRA-ABDOMINAL VOID: ICD-10-PCS | Mod: 26,S$PBB,51, | Performed by: UROLOGY

## 2019-06-21 PROCEDURE — 51741 PR UROFLOWMETRY, COMPLEX: ICD-10-PCS | Mod: 26,S$PBB,51, | Performed by: UROLOGY

## 2019-06-21 PROCEDURE — 51728 PR COMPLEX CYSTOMETROGRAM VOIDING PRESSURE STUDIES: ICD-10-PCS | Mod: 26,S$PBB,51, | Performed by: UROLOGY

## 2019-06-21 PROCEDURE — 51741 ELECTRO-UROFLOWMETRY FIRST: CPT | Mod: PBBFAC | Performed by: UROLOGY

## 2019-06-21 PROCEDURE — 52000 CYSTOURETHROSCOPY: CPT | Mod: S$PBB,51,, | Performed by: UROLOGY

## 2019-06-21 PROCEDURE — 51797 INTRAABDOMINAL PRESSURE TEST: CPT | Mod: PBBFAC | Performed by: UROLOGY

## 2019-06-21 PROCEDURE — 51784 PR ANAL/URINARY MUSCLE STUDY: ICD-10-PCS | Mod: 26,S$PBB,51, | Performed by: UROLOGY

## 2019-06-21 NOTE — PATIENT INSTRUCTIONS
SIMPLE URODYNAMIC STUDY (SUDS) & CYSTOSCOPY  UROLOGY CLINIC DISCHARGE INSTRUCTIONS    You have had a procedure that will require time to properly heal. Follow the instructions you have been given on how to care for yourself once you are home. Below is additional information to help in your recovery.    ACTIVITY  · There are no restrictions in activity. Start doing again the things you did before the procedure.  · You may experience a slight burning sensation. You may notice a small amount of blood in your urine. This will clear up within a day. Call the clinic if this continues beyond 48 hours.    DIET  · Continue your normal diet. You may eat the same foods you ate before your procedure.  · Drink plenty of fluids during the first 24-48 hours following your procedure.    MEDICATIONS  · Resume all other previous medications from your prescribing physician.  · Continue any pre=procedure antibiotics until they are all gone.    SIGNS AND SYMPTOMS TO REPORT TO THE DOCTOR  · Chills or fever greater than 101° F within 24 hours of procedure.  · Changes in urination, such as increased bleeding, foul smell, cloudy urine, or painful urination.  · Call your doctor with any questions or concerns.    For any emergency situation, call [phone] immediately or go to your nearest emergency room.    [facility]  [phone]    _                                                                                                                                                                                             If any problems after hours or weekends, you may call [phone] and ask for the urology resident on call.

## 2019-06-21 NOTE — PROGRESS NOTES
After Mr. Luna's SUDS/cysto, discussed that he still has prostatic tissue mostly near the apex.  Discussed options including TURP, observation, repeat Rezum.  He'd like rezum.     Discussed risks/benefits of Rezum.  Discussed bleeding, frequency, failure amongst other risks.  Also discussed very small risk of EjD and ED.  He was given the chance to ask questions.  Alternatives discussed.  Will schedule for Rezum.

## 2019-06-21 NOTE — PROCEDURES
Procedures   Surgeon: Neville    Assistant: None    Procedure performed: 1. Simple Urodynamic Study (Complex uroflometry, complex CMG, Intraabdominal voiding pressure study)                                          2. cystoscopy  Blood loss: None    Specimen: None    Procedure in detail:     Urodynamic Report    Indication:    Patient was taken to the Urodynamic Suite with a comfortably full bladder and asked to perform a free uroflow.  Next, the patient was prepped and the urinary residual was drained with a 14 Fr catheter.  A 7 Fr dual lumen catheter was placed to measure intravesical pressures.  A 10 Fr balloon manometer was placed into the rectum for abdominal pressure measurements.  Patch EMG electrodes were placed on the perineum.  The patient was connected to the urodynamics machine, using a multichannel technique.  The bladder was filled with sterile water at room temperature at a rate of 30 ml/min.   Filling is performed with the patient in the seated position.  The patient was then asked to void for a pressure flow study.    The following are the results of the study:  1.  Uroflow        Q max: 64.5 cc/s       Voided volume: 289 cc       Pattern of the curve: saw tooth    2.  PVR: 300 cc    3.  CMG       Sensation:         First Desire: 261 cc         Normal Desire: 316 cc         Strong Desire: 363 cc         Urgency: 509 cc       Capacity: 509 cc       Abnormal Contractions: none       Compliance: normal    4.  Abdominal Leak Point Pressure:       Valsalva: n/a       Cough: n/a    5.  EMG: normal    6.  Voiding phase       Q max: 21 cc/s       P det at Q max: 66 cm H2O       Pattern of the curve: saw tooth        Voided volume: 300 cc       PVR: 400 cc    7.  Analysis: GAONA    8.  Impression: GAONA      After performing the SUDS, the flexible cystoscope was assembled and passed into the bladder using standard sterile technique.   The prostatic length was approximately  4 cm.

## 2019-06-24 ENCOUNTER — DOCUMENTATION ONLY (OUTPATIENT)
Dept: UROLOGY | Facility: CLINIC | Age: 74
End: 2019-06-24

## 2019-06-24 NOTE — PROGRESS NOTES
Mr. Luna stated he changed his mind and would like TURP.     The risks and benefits of TURP were discussed in detail today.  We specifically addressed the chance of failure, urinary frequency, incontinence, erectile dysfunction, dysuria, hematuria, TUR syndrome.  We discussed these amongst other risks.  He was given the chance to ask questions.  Will schedule for elective TURP.

## 2019-06-27 ENCOUNTER — OFFICE VISIT (OUTPATIENT)
Dept: UROLOGY | Facility: CLINIC | Age: 74
End: 2019-06-27
Payer: MEDICARE

## 2019-06-27 ENCOUNTER — TELEPHONE (OUTPATIENT)
Dept: UROLOGY | Facility: CLINIC | Age: 74
End: 2019-06-27

## 2019-06-27 VITALS
HEART RATE: 84 BPM | SYSTOLIC BLOOD PRESSURE: 160 MMHG | BODY MASS INDEX: 27.89 KG/M2 | DIASTOLIC BLOOD PRESSURE: 88 MMHG | WEIGHT: 211.44 LBS

## 2019-06-27 DIAGNOSIS — N40.1 BPH WITH OBSTRUCTION/LOWER URINARY TRACT SYMPTOMS: Primary | ICD-10-CM

## 2019-06-27 DIAGNOSIS — R82.71 BACTERIA IN URINE: ICD-10-CM

## 2019-06-27 DIAGNOSIS — N13.8 BPH WITH OBSTRUCTION/LOWER URINARY TRACT SYMPTOMS: Primary | ICD-10-CM

## 2019-06-27 PROCEDURE — 99999 PR PBB SHADOW E&M-EST. PATIENT-LVL III: ICD-10-PCS | Mod: PBBFAC,,, | Performed by: NURSE PRACTITIONER

## 2019-06-27 PROCEDURE — 51798 US URINE CAPACITY MEASURE: CPT | Mod: PBBFAC | Performed by: NURSE PRACTITIONER

## 2019-06-27 PROCEDURE — 99214 OFFICE O/P EST MOD 30 MIN: CPT | Mod: S$PBB,,, | Performed by: NURSE PRACTITIONER

## 2019-06-27 PROCEDURE — 81002 URINALYSIS NONAUTO W/O SCOPE: CPT | Mod: PBBFAC | Performed by: NURSE PRACTITIONER

## 2019-06-27 PROCEDURE — 87077 CULTURE AEROBIC IDENTIFY: CPT

## 2019-06-27 PROCEDURE — 99214 PR OFFICE/OUTPT VISIT, EST, LEVL IV, 30-39 MIN: ICD-10-PCS | Mod: S$PBB,,, | Performed by: NURSE PRACTITIONER

## 2019-06-27 PROCEDURE — 87186 SC STD MICRODIL/AGAR DIL: CPT

## 2019-06-27 PROCEDURE — 87088 URINE BACTERIA CULTURE: CPT

## 2019-06-27 PROCEDURE — 99213 OFFICE O/P EST LOW 20 MIN: CPT | Mod: PBBFAC,25 | Performed by: NURSE PRACTITIONER

## 2019-06-27 PROCEDURE — 99999 PR PBB SHADOW E&M-EST. PATIENT-LVL III: CPT | Mod: PBBFAC,,, | Performed by: NURSE PRACTITIONER

## 2019-06-27 PROCEDURE — 87086 URINE CULTURE/COLONY COUNT: CPT

## 2019-06-27 RX ORDER — NITROFURANTOIN 25; 75 MG/1; MG/1
100 CAPSULE ORAL 2 TIMES DAILY
Qty: 14 CAPSULE | Refills: 0 | Status: SHIPPED | OUTPATIENT
Start: 2019-06-27 | End: 2019-06-27 | Stop reason: SDUPTHER

## 2019-06-27 RX ORDER — NITROFURANTOIN 25; 75 MG/1; MG/1
100 CAPSULE ORAL 2 TIMES DAILY
Qty: 14 CAPSULE | Refills: 0 | Status: SHIPPED | OUTPATIENT
Start: 2019-06-27 | End: 2019-07-01 | Stop reason: ALTCHOICE

## 2019-06-27 NOTE — PROGRESS NOTES
CHIEF COMPLAINT:    Mr. Luna is a 74 y.o. male presenting for possible UTI.  PRESENTING ILLNESS:    Lakeshia Luna is a 74 y.o. male with a PMH of BPH and incomplete bladder emptying who presents for possible UTI. His last clinic visit was 5/16/19 with Dr. Lozada.    S/p jackie 1/22/19 6/21/19 SUDS/cysto with Dr. Lozada    Patient presents to clinic for dysuria, decreased urine stream, worsening frequency, urgency and nocturia for the past 6 days (since SUDS) that have gradually worsened each day. He reports a temp of 100.5 yesterday and 99.5 today. He is scheduled for TURP with Dr. Lozada 7/16/19. Denies flank pain or hematuria.      REVIEW OF SYSTEMS:    Review of Systems    Constitutional: Positive fever.  HENT: Negative for hearing loss.   Eyes: Wears glasses. Negative for eye discharge   Respiratory: Negative for shortness of breath.   Cardiovascular: Negative for chest pain.   Gastrointestinal: Negative for nausea, vomiting, and constipation.   Genitourinary:  See above  Neurological: Negative for dizziness.   Hematological: Does not bruise/bleed easily.   Psychiatric/Behavioral: Negative for confusion.       PATIENT HISTORY:    Past Medical History:   Diagnosis Date    Atrial fibrillation     BPH with urinary obstruction     Hyperlipemia     Hypertension        Past Surgical History:   Procedure Laterality Date    BACK SURGERY      CARDIAC PACEMAKER PLACEMENT      DESTRUCTION, PROSTATE, TRANSURETHRAL N/A 1/22/2019    Performed by Fermin Lozada MD at [facility] OR [address] [place] [address]    HERNIA REPAIR      NECK SURGERY      VASECTOMY         History reviewed. No pertinent family history.    Social History     Socioeconomic History    Marital status:      Spouse name: Not on file    Number of children: Not on file    Years of education: Not on file    Highest education level: Not on file   Occupational History    Not on file   Social Needs    Financial resource strain: Not on file    Food insecurity:      Worry: Not on file     Inability: Not on file    Transportation needs:     Medical: Not on file     Non-medical: Not on file   Tobacco Use    Smoking status: Former Smoker     Types: Cigarettes    Smokeless tobacco: Former User   Substance and Sexual Activity    Alcohol use: Yes    Drug use: No    Sexual activity: Not on file   Lifestyle    Physical activity:     Days per week: Not on file     Minutes per session: Not on file    Stress: Not on file   Relationships    Social connections:     Talks on phone: Not on file     Gets together: Not on file     Attends Adventism service: Not on file     Active member of club or organization: Not on file     Attends meetings of clubs or organizations: Not on file     Relationship status: Not on file   Other Topics Concern    Not on file   Social History Narrative    Not on file       Allergies:  Ace inhibitors and Carvedilol    Medications:    Current Outpatient Medications:     amLODIPine (NORVASC) 2.5 MG tablet, , Disp: , Rfl: 11    amLODIPine (NORVASC) 2.5 MG tablet, Norvasc 2.5 mg tablet  Take 1 tablet every day by oral route., Disp: , Rfl:     aspirin (ECOTRIN) 81 MG EC tablet, Take 81 mg by mouth once daily., Disp: , Rfl:     atorvastatin (LIPITOR) 10 MG tablet, Take 10 mg by mouth once daily., Disp: , Rfl:     co-enzyme Q-10 50 mg capsule, Take 100 mg by mouth once daily., Disp: , Rfl:     cyclobenzaprine (FLEXERIL) 10 MG tablet, , Disp: , Rfl: 0    docusate sodium (COLACE) 100 MG capsule, Take 100 mg by mouth 2 (two) times daily., Disp: , Rfl:     finasteride (PROSCAR) 5 mg tablet, , Disp: , Rfl: 6    furosemide (LASIX) 20 MG tablet, , Disp: , Rfl: 11    hydrALAZINE (APRESOLINE) 25 MG tablet, , Disp: , Rfl: 11    potassium chloride (MICRO-K) 8 mEq CpSR, , Disp: , Rfl: 3    tamsulosin (FLOMAX) 0.4 mg Cap, , Disp: , Rfl: 6    traMADol (ULTRAM) 50 mg tablet, , Disp: , Rfl: 0    warfarin (COUMADIN) 2.5 MG tablet, 7.5 mg every Mon, Wed, Fri. ,  Disp: , Rfl: 11    warfarin (COUMADIN) 5 MG tablet, , Disp: , Rfl: 11    nitrofurantoin, macrocrystal-monohydrate, (MACROBID) 100 MG capsule, Take 1 capsule (100 mg total) by mouth 2 (two) times daily. Call coumadin clinic prior to starting medication for 7 days, Disp: 14 capsule, Rfl: 0    oxybutynin (DITROPAN) 5 MG Tab, Take 1 tablet (5 mg total) by mouth 3 (three) times daily as needed (bladder spasms)., Disp: 21 tablet, Rfl: 0    PHYSICAL EXAMINATION:    Constitutional: He is oriented to person, place, and time. He appears well-developed and well-nourished.  He is in no apparent distress.    Neck: Normal ROM.     Cardiovascular: Normal rate.      Pulmonary/Chest: Effort normal. No respiratory distress.     Abdominal:  He exhibits no distension.  There is no CVA tenderness.     Lymphadenopathy:        Right: No supraclavicular adenopathy present.        Left: No supraclavicular adenopathy present.     Neurological: He is alert and oriented to person, place, and time.     Skin: Skin is warm and dry.     Extremities: Normal ROM    Psych: Cooperative with normal affect.      Physical Exam      LABS:    U/a: sp grav 1.005, pH 7, ++ leukocytes, + nitrites, trace protein, 50 blood, otherwise negative    PVR: done in clinic with bladder scanner by Nurse Humphrey was 270 ml.      IMPRESSION:    Encounter Diagnoses   Name Primary?    BPH with obstruction/lower urinary tract symptoms Yes    Bacteria in urine          PLAN:  -Discussed patient with Dr. Lozada since he is scheduled for surgery 7/16/19.  -Urine specimen sent for urine culture  -Will start patient on macrobid per Dr. Lozada (1/22/19 creatinine 1.0/GFR >60).  Discussed side effects, indications, and MOA for macrobid. Prescription sent to the pharmacy. Pt verbalized understanding.  Pt is on coumadin. He will notify coumadin clinic before starting macrobid.  -If urine culture is sensitive to Macrobid, can keep patient on macrobid until surgery scheduled  7/16/19.  If urine culture is resistant to Macrobid, will change antibiotic and repeat culture after treatment. May need to reschedule surgery. Will notify Dr. Lozada once culture results.  Pt is aware of plan.  -If symptoms worsen, go to ED.  -RTC pending urine culture results    I spent 25 minutes with the patient of which more than half was spent in coordinating the patient's care as well as in direct consultation with the patient in regards to our treatment and plan.

## 2019-06-27 NOTE — TELEPHONE ENCOUNTER
Spoke to pt. Pt c/o difficulty urinating, decreased stream. Low abdominal discomfort, symptoms started after suds procedure and have not resolved but increased. Pt scheduled for appt to see np today for eval. Pt aware and confirmed appt.

## 2019-06-27 NOTE — TELEPHONE ENCOUNTER
----- Message from Olga Magallanes sent at 6/27/2019  8:24 AM CDT -----  Contact: pt#[phone]  Needs Advice    Reason for call:Pt states that he has pain when he urinate and a decrease in urine flow         Communication Preference:call    Additional Information:

## 2019-06-29 LAB — BACTERIA UR CULT: ABNORMAL

## 2019-07-01 ENCOUNTER — TELEPHONE (OUTPATIENT)
Dept: PEDIATRIC UROLOGY | Facility: CLINIC | Age: 74
End: 2019-07-01

## 2019-07-01 DIAGNOSIS — A49.9 BACTERIAL UTI: Primary | ICD-10-CM

## 2019-07-01 DIAGNOSIS — N39.0 BACTERIAL UTI: Primary | ICD-10-CM

## 2019-07-01 RX ORDER — SULFAMETHOXAZOLE AND TRIMETHOPRIM 800; 160 MG/1; MG/1
1 TABLET ORAL 2 TIMES DAILY
Qty: 30 TABLET | Refills: 0 | Status: ON HOLD | OUTPATIENT
Start: 2019-07-01 | End: 2019-07-17 | Stop reason: HOSPADM

## 2019-07-01 NOTE — TELEPHONE ENCOUNTER
Spoke with patient regarding urine culture results.  Urine culture positive KLEBSIELLA PNEUMONIAE   >100,000 cfu/ml     Discussed with Dr. Lozada. Will have patient take bactrim until surgery date.    Instructed for patient to stop macrobid.  Start bactrim.  Discussed side effects, indications, and MOA for bactrim. Prescription sent to the pharmacy. Pt verbalized understanding.  Allergies reviewed  1/22/19 creatinine 1.0 / GFR >60  Will notify coumadin clinic prior to starting antibiotic.

## 2019-07-01 NOTE — TELEPHONE ENCOUNTER
Called patient regarding urine culture results.  No answer  Left voicemail to return call to clinic.

## 2019-07-02 ENCOUNTER — ANESTHESIA EVENT (OUTPATIENT)
Dept: SURGERY | Facility: HOSPITAL | Age: 74
End: 2019-07-02
Payer: MEDICARE

## 2019-07-02 DIAGNOSIS — Z01.818 PRE-OP TESTING: Primary | ICD-10-CM

## 2019-07-02 NOTE — PRE ADMISSION SCREENING
Anesthesia Assessment: Preoperative EQUATION    Planned Procedure: Procedure(s) (LRB):  TURP (TRANSURETHRAL RESECTION OF PROSTATE) (N/A)  Requested Anesthesia Type:General  Surgeon: Fermin Lozada MD  Service: Urology  Known or anticipated Date of Surgery:7/16/2019    Optimization:  Anesthesia Preop Clinic Assessment  Indicated NOT REQUIRED FOR PROCEDURE    Medical Opinion Indicated       Sub-specialist consult indicated:  CARDIOLOGY         Plan:    Testing:  Hemoglobin and BMP     Consultation:Patient's PCP for a statement of optimization       Navigation: Tests Scheduled.              Consults scheduled.             Results will be tracked by Preop Clinic.                        No anesthesia preop clinic visit required.

## 2019-07-02 NOTE — ANESTHESIA PREPROCEDURE EVALUATION
Irasema Merritt RN   Registered Nurse      Pre Admission Screening   Signed                       Anesthesia Assessment: Preoperative EQUATION     Planned Procedure: Procedure(s) (LRB):  TURP (TRANSURETHRAL RESECTION OF PROSTATE) (N/A)  Requested Anesthesia Type:General  Surgeon: Fermin Lozada MD  Service: Urology  Known or anticipated Date of Surgery:7/16/2019                           Optimization:  Anesthesia Preop Clinic Assessment  Indicated NOT REQUIRED FOR PROCEDURE    Medical Opinion Indicated                            Sub-specialist consult indicated:  CARDIOLOGY                  Plan:    Testing:  Hemoglobin and BMP                           Consultation:Patient's CARDIOLOGIST FOR CLEARANCE 7/3     RECEIVED CLEARANCE FROM CARDIOLOGIST PLACED IN MEDIA 7/12    Navigation: Tests Scheduled.                         Consults scheduled.                        Results will be tracked by Preop Clinic.                           No anesthesia preop clinic visit required.          Electronically signed by Irasema Merritt RN at 7/2/2019  4:36 PM                                                                                                  07/02/2019  Lakeshia Luna is a 74 y.o., male.    Anesthesia Evaluation         Review of Systems  Anesthesia Hx:  No problems with previous Anesthesia   Social:  Former Smoker, Alcohol Use    Hematology/Oncology:  Hematology Normal   Oncology Normal     EENT/Dental:EENT/Dental Normal   Cardiovascular:   Exercise tolerance: good Hypertension CAD  CABG/stent  PVD, PACEMAKER   Pulmonary:  Pulmonary Normal    Renal/:   BPH    Hepatic/GI:  Hepatic/GI Normal    Neurological:   TIA,    Endocrine:  Endocrine Normal    Psych:  Psychiatric Normal       JONEL    Physical Exam  General:  Well nourished    Airway/Jaw/Neck:  Airway Findings: Mouth Opening: Normal Tongue: Normal  General Airway Assessment: Adult  Mallampati: II  Improves to II with phonation.  TM Distance:  Normal, at least 6 cm      Dental:  Dental Findings: In tact   Chest/Lungs:  Chest/Lungs Findings: Clear to auscultation     Heart/Vascular:  Heart Findings: Rate: Normal  Rhythm: Regular Rhythm  Sounds: Normal        Mental Status:  Mental Status Findings:  Cooperative, Alert and Oriented         Anesthesia Plan  Type of Anesthesia, risks & benefits discussed:  Anesthesia Type:  general  Patient's Preference: General  Intra-op Monitoring Plan: standard ASA monitors  Intra-op Monitoring Plan Comments: Standard ASA monitors.   Post Op Pain Control Plan: per primary service following discharge from PACU  Post Op Pain Control Plan Comments: Per primary service.     Induction:   IV  Beta Blocker:  Patient is not currently on a Beta-Blocker (No further documentation required).       Informed Consent: Patient understands risks and agrees with Anesthesia plan.  Questions answered. Anesthesia consent signed with patient.  ASA Score: 3     Day of Surgery Review of History & Physical:    H&P update referred to the surgeon.     Anesthesia Plan Notes: Chart reviewed, patient interviewed and examined.  The plan for general anesthesia was explained.  Questions were answered and the consent was signed.  Enriqueta PRITCHARD         Ready For Surgery From Anesthesia Perspective.

## 2019-07-03 NOTE — PRE-PROCEDURE INSTRUCTIONS
Spoke to pt. this am his cardiologist is in [place]  Dr Castillo--- messaged for clearance and med instructions.

## 2019-07-10 ENCOUNTER — HOSPITAL ENCOUNTER (OUTPATIENT)
Dept: CARDIOLOGY | Facility: CLINIC | Age: 74
Discharge: HOME | End: 2019-07-10
Payer: MEDICARE

## 2019-07-10 ENCOUNTER — OFFICE VISIT (OUTPATIENT)
Dept: UROLOGY | Facility: CLINIC | Age: 74
End: 2019-07-10
Payer: MEDICARE

## 2019-07-10 DIAGNOSIS — I10 HYPERTENSION, UNSPECIFIED TYPE: ICD-10-CM

## 2019-07-10 DIAGNOSIS — E78.5 HYPERLIPIDEMIA, UNSPECIFIED HYPERLIPIDEMIA TYPE: ICD-10-CM

## 2019-07-10 DIAGNOSIS — Z01.818 PRE-OP TESTING: ICD-10-CM

## 2019-07-10 DIAGNOSIS — N40.1 BPH WITH URINARY OBSTRUCTION: Primary | ICD-10-CM

## 2019-07-10 DIAGNOSIS — N13.8 BPH WITH URINARY OBSTRUCTION: Primary | ICD-10-CM

## 2019-07-10 DIAGNOSIS — I48.20 CHRONIC ATRIAL FIBRILLATION: ICD-10-CM

## 2019-07-10 PROCEDURE — 93005 ELECTROCARDIOGRAM TRACING: CPT | Mod: PBBFAC | Performed by: INTERNAL MEDICINE

## 2019-07-10 PROCEDURE — 93010 EKG 12-LEAD: ICD-10-PCS | Mod: S$PBB,,, | Performed by: INTERNAL MEDICINE

## 2019-07-10 PROCEDURE — 99499 NO LOS: ICD-10-PCS | Mod: S$PBB,,, | Performed by: UROLOGY

## 2019-07-10 PROCEDURE — 99499 UNLISTED E&M SERVICE: CPT | Mod: S$PBB,,, | Performed by: UROLOGY

## 2019-07-10 PROCEDURE — 93010 ELECTROCARDIOGRAM REPORT: CPT | Mod: S$PBB,,, | Performed by: INTERNAL MEDICINE

## 2019-07-10 RX ORDER — SODIUM CHLORIDE 9 MG/ML
INJECTION, SOLUTION INTRAVENOUS CONTINUOUS
Status: CANCELLED | OUTPATIENT
Start: 2019-07-10

## 2019-07-10 NOTE — H&P (VIEW-ONLY)
Urology ([facility]) H&P  Staff: Fermin Lozada MD    CC: LUTS, desiring treatment    HPI: Lakeshia Luna is a 74 y.o. male with PMH including HTN, HLD, a fib s/p pacemaker placement and CAD s/p stent placement is here to plan his TURP due to refractory LUTS.   He's s/p rezum on 1/22/19.  He is voiding better, but it is not as good as he would like. He has improved FOS.  However, he still has hesitancy, frequency and intermittency and nocturia x2-3. He also has hx recurrent urinary infections and recurrent prostatitis.  Last urine culture on 6/27/19 demonstrated Klebsiella UTI. Patient treated with Bactrim, still on this and will take until day of surgery.     He's on flomax. No longer on proscar - he stopped this due to breast pain. He does not have a  catheter in place.    He had cystoscopy 6/21/19. This demonstrated prostatic length of 4 cm. 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     He also c/o ED.  This has been present for > 1 year.  He's tried and failed oral meds, a MATHEW, and ICI.  He'd like his LUTS treated before considering IPP.    Reports that a fib has been controlled with pacemaker. Takes coumadin for pacemaker and hx cardiac stents.     Indications for TURP: BPH refractory to medical management and Rezum therapy.       ROS:  Neg except per HPI    Past Medical History:   Diagnosis Date    Atrial fibrillation     BPH with urinary obstruction     Hyperlipemia     Hypertension        Past Surgical History:   Procedure Laterality Date    BACK SURGERY      CARDIAC PACEMAKER PLACEMENT      DESTRUCTION, PROSTATE, TRANSURETHRAL N/A 1/22/2019    Performed by [facility] at [facility] OR [address] [place] [address]    HERNIA REPAIR      NECK SURGERY      VASECTOMY         Social History     Socioeconomic History    Marital status:      Spouse name: Not on file    Number of children: Not on file    Years of education: Not on file    Highest education level: Not on file   Occupational History    Not on  file   Social Needs    Financial resource strain: Not on file    Food insecurity:     Worry: Not on file     Inability: Not on file    Transportation needs:     Medical: Not on file     Non-medical: Not on file   Tobacco Use    Smoking status: Former Smoker     Types: Cigarettes    Smokeless tobacco: Former User   Substance and Sexual Activity    Alcohol use: Yes    Drug use: No    Sexual activity: Not on file   Lifestyle    Physical activity:     Days per week: Not on file     Minutes per session: Not on file    Stress: Not on file   Relationships    Social connections:     Talks on phone: Not on file     Gets together: Not on file     Attends [employer]ism service: Not on file     Active member of club or organization: Not on file     Attends meetings of clubs or organizations: Not on file     Relationship status: Not on file   Other Topics Concern    Not on file   Social History Narrative    Not on file       No family history on file.    Review of patient's allergies indicates:   Allergen Reactions    Ace inhibitors     Carvedilol        Current Outpatient Medications on File Prior to Visit   Medication Sig Dispense Refill    amLODIPine (NORVASC) 2.5 MG tablet   11    amLODIPine (NORVASC) 2.5 MG tablet Norvasc 2.5 mg tablet   Take 1 tablet every day by oral route.      aspirin (ECOTRIN) 81 MG EC tablet Take 81 mg by mouth once daily.      atorvastatin (LIPITOR) 10 MG tablet Take 10 mg by mouth every evening.       co-enzyme Q-10 50 mg capsule Take 100 mg by mouth once daily.      docusate sodium (COLACE) 100 MG capsule Take 100 mg by mouth 2 (two) times daily.      furosemide (LASIX) 20 MG tablet   11    hydrALAZINE (APRESOLINE) 25 MG tablet Take 25 mg by mouth every 8 (eight) hours.   11    potassium chloride (MICRO-K) 8 mEq CpSR Take 8 mEq by mouth once daily.   3    sulfamethoxazole-trimethoprim 800-160mg (BACTRIM DS) 800-160 mg Tab Take 1 tablet by mouth 2 (two) times daily. for 15 days  30 tablet 0    tamsulosin (FLOMAX) 0.4 mg Cap Take 0.4 mg by mouth every evening.   6    warfarin (COUMADIN) 5 MG tablet Take 5 mg by mouth Daily.   11     No current facility-administered medications on file prior to visit.        Anticoagulation:  Yes Coumadin, for pacemaker and cardiac stent per patient report, was told to stop this 5 days prior to procedure by anesthesia     Physical Exam:  Weight 211lb/95.9kg    General appearance: alert, appears stated age and cooperative  Head: Normocephalic, without obvious abnormality, atraumatic  Back: symmetric, no curvature. ROM normal. No CVA tenderness.  Lungs: clear to auscultation bilaterally and unlabored breathing  Male genitalia: uncircumcised penis: no lesions or discharge. testes: no masses or tenderness. no hernias  Rectal: normal tone, normal prostate, no masses or tenderness and the prostate is of normal size and consistency, nontender, & without nodules    Labs:    Urine dipstick today showed +leuk, negative for all other components     No results found for: WBC, HGB, HCT, MCV, PLT    BMP  Lab Results   Component Value Date     01/22/2019    K 3.8 01/22/2019     01/22/2019    CO2 30 (H) 01/22/2019    BUN 13 01/22/2019    CREATININE 1.0 01/22/2019    CALCIUM 9.7 01/22/2019    ANIONGAP 8 01/22/2019    ESTGFRAFRICA >60.0 01/22/2019    EGFRNONAA >60.0 01/22/2019       No results found for: PSA, PSADIAG, PSATOTAL, PSAFREE, PSAFREEPCT    UCx 6/27/19 - Klebsiella UTI, sensitive to bactrim    Assessment: Lakeshia Luna is a 74 y.o. male with LUTS refractory to prior management     Plan:     1. To OR on 7/16/19 for TURP.  2. Consents signed for surgery.  3. I have explained the risk, benefits, and alternatives of the procedure in detail. The patient voices understanding and all questions have been answered. The patient agrees to proceed as planned.    4. Per patient, he is in the process of being cleared by anesthesia. The anesthesia team is in contact with  his PCP, cardiologist, and EP specialist. He was instructed to hold coumadin 5 days before his procedure.   5. Surgical and blood consents obtained and signed  6. He will continue Bactrim until day of surgery     Kaley Tracy MD

## 2019-07-10 NOTE — PROGRESS NOTES
Urology ([facility]) H&P  Staff: Fermin Lozada MD    CC: LUTS, desiring treatment    HPI: Lakeshia Luna is a 74 y.o. male with PMH including HTN, HLD, a fib s/p pacemaker placement and CAD s/p stent placement is here to plan his TURP due to refractory LUTS.   He's s/p rezum on 1/22/19.  He is voiding better, but it is not as good as he would like. He has improved FOS.  However, he still has hesitancy, frequency and intermittency and nocturia x2-3. He also has hx recurrent urinary infections and recurrent prostatitis.  Last urine culture on 6/27/19 demonstrated Klebsiella UTI. Patient treated with Bactrim, still on this and will take until day of surgery.     He's on flomax. No longer on proscar - he stopped this due to breast pain. He does not have a  catheter in place.    He had cystoscopy 6/21/19. This demonstrated prostatic length of 4 cm. 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     He also c/o ED.  This has been present for > 1 year.  He's tried and failed oral meds, a MATHEW, and ICI.  He'd like his LUTS treated before considering IPP.    Reports that a fib has been controlled with pacemaker. Takes coumadin for pacemaker and hx cardiac stents.     Indications for TURP: BPH refractory to medical management and Rezum therapy.       ROS:  Neg except per HPI    Past Medical History:   Diagnosis Date    Atrial fibrillation     BPH with urinary obstruction     Hyperlipemia     Hypertension        Past Surgical History:   Procedure Laterality Date    BACK SURGERY      CARDIAC PACEMAKER PLACEMENT      DESTRUCTION, PROSTATE, TRANSURETHRAL N/A 1/22/2019    Performed by Fermin Lozada MD at [facility] OR [address] [place] [address]    HERNIA REPAIR      NECK SURGERY      VASECTOMY         Social History     Socioeconomic History    Marital status:      Spouse name: Not on file    Number of children: Not on file    Years of education: Not on file    Highest education level: Not on file   Occupational History    Not on  file   Social Needs    Financial resource strain: Not on file    Food insecurity:     Worry: Not on file     Inability: Not on file    Transportation needs:     Medical: Not on file     Non-medical: Not on file   Tobacco Use    Smoking status: Former Smoker     Types: Cigarettes    Smokeless tobacco: Former User   Substance and Sexual Activity    Alcohol use: Yes    Drug use: No    Sexual activity: Not on file   Lifestyle    Physical activity:     Days per week: Not on file     Minutes per session: Not on file    Stress: Not on file   Relationships    Social connections:     Talks on phone: Not on file     Gets together: Not on file     Attends Pentec[employer]alism service: Not on file     Active member of club or organization: Not on file     Attends meetings of clubs or organizations: Not on file     Relationship status: Not on file   Other Topics Concern    Not on file   Social History Narrative    Not on file       No family history on file.    Review of patient's allergies indicates:   Allergen Reactions    Ace inhibitors     Carvedilol        Current Outpatient Medications on File Prior to Visit   Medication Sig Dispense Refill    amLODIPine (NORVASC) 2.5 MG tablet   11    amLODIPine (NORVASC) 2.5 MG tablet Norvasc 2.5 mg tablet   Take 1 tablet every day by oral route.      aspirin (ECOTRIN) 81 MG EC tablet Take 81 mg by mouth once daily.      atorvastatin (LIPITOR) 10 MG tablet Take 10 mg by mouth every evening.       co-enzyme Q-10 50 mg capsule Take 100 mg by mouth once daily.      docusate sodium (COLACE) 100 MG capsule Take 100 mg by mouth 2 (two) times daily.      furosemide (LASIX) 20 MG tablet   11    hydrALAZINE (APRESOLINE) 25 MG tablet Take 25 mg by mouth every 8 (eight) hours.   11    potassium chloride (MICRO-K) 8 mEq CpSR Take 8 mEq by mouth once daily.   3    sulfamethoxazole-trimethoprim 800-160mg (BACTRIM DS) 800-160 mg Tab Take 1 tablet by mouth 2 (two) times daily. for 15 days  30 tablet 0    tamsulosin (FLOMAX) 0.4 mg Cap Take 0.4 mg by mouth every evening.   6    warfarin (COUMADIN) 5 MG tablet Take 5 mg by mouth Daily.   11     No current facility-administered medications on file prior to visit.        Anticoagulation:  Yes Coumadin, for pacemaker and cardiac stent per patient report, was told to stop this 5 days prior to procedure by anesthesia     Physical Exam:  Weight 211lb/95.9kg    General appearance: alert, appears stated age and cooperative  Head: Normocephalic, without obvious abnormality, atraumatic  Back: symmetric, no curvature. ROM normal. No CVA tenderness.  Lungs: clear to auscultation bilaterally and unlabored breathing  Male genitalia: uncircumcised penis: no lesions or discharge. testes: no masses or tenderness. no hernias  Rectal: normal tone, normal prostate, no masses or tenderness and the prostate is of normal size and consistency, nontender, & without nodules    Labs:    Urine dipstick today showed +leuk, negative for all other components     No results found for: WBC, HGB, HCT, MCV, PLT    BMP  Lab Results   Component Value Date     01/22/2019    K 3.8 01/22/2019     01/22/2019    CO2 30 (H) 01/22/2019    BUN 13 01/22/2019    CREATININE 1.0 01/22/2019    CALCIUM 9.7 01/22/2019    ANIONGAP 8 01/22/2019    ESTGFRAFRICA >60.0 01/22/2019    EGFRNONAA >60.0 01/22/2019       No results found for: PSA, PSADIAG, PSATOTAL, PSAFREE, PSAFREEPCT    UCx 6/27/19 - Klebsiella UTI, sensitive to bactrim    Assessment: Lakeshia Luna is a 74 y.o. male with LUTS refractory to prior management     Plan:     1. To OR on 7/16/19 for TURP.  2. Consents signed for surgery.  3. I have explained the risk, benefits, and alternatives of the procedure in detail. The patient voices understanding and all questions have been answered. The patient agrees to proceed as planned.    4. Per patient, he is in the process of being cleared by anesthesia. The anesthesia team is in contact with  his PCP, cardiologist, and EP specialist. He was instructed to hold coumadin 5 days before his procedure.   5. Surgical and blood consents obtained and signed  6. He will continue Bactrim until day of surgery     Kaley Tracy MD

## 2019-07-12 DIAGNOSIS — Z79.01 ANTICOAGULATED ON COUMADIN: Primary | ICD-10-CM

## 2019-07-12 NOTE — PRE-PROCEDURE INSTRUCTIONS
RECEIVED CLEARANCE FROM CARDIOLOGIST PLACED IN MEDIA. SPOKE TO PT & PT VERBALIZED UNDERSTANDING OF INSTRUCTIONS GIVEN TO HIM BY HIS CARDIOLOGIST

## 2019-07-15 ENCOUNTER — TELEPHONE (OUTPATIENT)
Dept: UROLOGY | Facility: CLINIC | Age: 74
End: 2019-07-15

## 2019-07-16 ENCOUNTER — ANESTHESIA (OUTPATIENT)
Dept: SURGERY | Facility: HOSPITAL | Age: 74
End: 2019-07-16
Payer: MEDICARE

## 2019-07-16 ENCOUNTER — DOCUMENTATION ONLY (OUTPATIENT)
Dept: ELECTROPHYSIOLOGY | Facility: CLINIC | Age: 74
End: 2019-07-16

## 2019-07-16 ENCOUNTER — HOSPITAL ENCOUNTER (OUTPATIENT)
Facility: HOSPITAL | Age: 74
Discharge: HOME | End: 2019-07-17
Attending: UROLOGY | Admitting: UROLOGY
Payer: MEDICARE

## 2019-07-16 DIAGNOSIS — I10 HYPERTENSION, UNSPECIFIED TYPE: ICD-10-CM

## 2019-07-16 DIAGNOSIS — N13.8 BPH WITH URINARY OBSTRUCTION: Primary | ICD-10-CM

## 2019-07-16 DIAGNOSIS — N40.1 BPH WITH URINARY OBSTRUCTION: Primary | ICD-10-CM

## 2019-07-16 DIAGNOSIS — E78.5 HYPERLIPIDEMIA, UNSPECIFIED HYPERLIPIDEMIA TYPE: ICD-10-CM

## 2019-07-16 DIAGNOSIS — Z79.01 ANTICOAGULATED ON COUMADIN: ICD-10-CM

## 2019-07-16 DIAGNOSIS — N52.01 ERECTILE DYSFUNCTION DUE TO ARTERIAL INSUFFICIENCY: ICD-10-CM

## 2019-07-16 DIAGNOSIS — I48.20 CHRONIC ATRIAL FIBRILLATION: ICD-10-CM

## 2019-07-16 LAB
INR PPP: 1 (ref 0.8–1.2)
PROTHROMBIN TIME: 10.8 SEC (ref 9–12.5)

## 2019-07-16 PROCEDURE — 25000003 PHARM REV CODE 250: Performed by: STUDENT IN AN ORGANIZED HEALTH CARE EDUCATION/TRAINING PROGRAM

## 2019-07-16 PROCEDURE — 88305 TISSUE EXAM BY PATHOLOGIST: CPT | Performed by: PATHOLOGY

## 2019-07-16 PROCEDURE — 63600175 PHARM REV CODE 636 W HCPCS: Performed by: STUDENT IN AN ORGANIZED HEALTH CARE EDUCATION/TRAINING PROGRAM

## 2019-07-16 PROCEDURE — D9220A PRA ANESTHESIA: Mod: CRNA,,, | Performed by: NURSE ANESTHETIST, CERTIFIED REGISTERED

## 2019-07-16 PROCEDURE — 71000044 HC DOSC ROUTINE RECOVERY FIRST HOUR: Performed by: UROLOGY

## 2019-07-16 PROCEDURE — D9220A PRA ANESTHESIA: ICD-10-PCS | Mod: ANES,,, | Performed by: ANESTHESIOLOGY

## 2019-07-16 PROCEDURE — 25000003 PHARM REV CODE 250: Performed by: NURSE ANESTHETIST, CERTIFIED REGISTERED

## 2019-07-16 PROCEDURE — D9220A PRA ANESTHESIA: Mod: ANES,,, | Performed by: ANESTHESIOLOGY

## 2019-07-16 PROCEDURE — 63600175 PHARM REV CODE 636 W HCPCS: Performed by: NURSE ANESTHETIST, CERTIFIED REGISTERED

## 2019-07-16 PROCEDURE — 52601 PR TRANSURETHRAL ELEC-SURG PROSTATECTOM: ICD-10-PCS | Mod: ,,, | Performed by: UROLOGY

## 2019-07-16 PROCEDURE — 85610 PROTHROMBIN TIME: CPT

## 2019-07-16 PROCEDURE — 71000015 HC POSTOP RECOV 1ST HR: Performed by: UROLOGY

## 2019-07-16 PROCEDURE — 36000707: Performed by: UROLOGY

## 2019-07-16 PROCEDURE — 71000016 HC POSTOP RECOV ADDL HR: Performed by: UROLOGY

## 2019-07-16 PROCEDURE — 88305 TISSUE EXAM BY PATHOLOGIST: CPT | Mod: 26,,, | Performed by: PATHOLOGY

## 2019-07-16 PROCEDURE — 37000009 HC ANESTHESIA EA ADD 15 MINS: Performed by: UROLOGY

## 2019-07-16 PROCEDURE — 88305 TISSUE SPECIMEN TO PATHOLOGY - SURGERY: ICD-10-PCS | Mod: 26,,, | Performed by: PATHOLOGY

## 2019-07-16 PROCEDURE — 37000008 HC ANESTHESIA 1ST 15 MINUTES: Performed by: UROLOGY

## 2019-07-16 PROCEDURE — 25000003 PHARM REV CODE 250: Performed by: UROLOGY

## 2019-07-16 PROCEDURE — 27201423 OPTIME MED/SURG SUP & DEVICES STERILE SUPPLY: Performed by: UROLOGY

## 2019-07-16 PROCEDURE — D9220A PRA ANESTHESIA: ICD-10-PCS | Mod: CRNA,,, | Performed by: NURSE ANESTHETIST, CERTIFIED REGISTERED

## 2019-07-16 PROCEDURE — 52601 PROSTATECTOMY (TURP): CPT | Mod: ,,, | Performed by: UROLOGY

## 2019-07-16 PROCEDURE — 36000706: Performed by: UROLOGY

## 2019-07-16 RX ORDER — ROCURONIUM BROMIDE 10 MG/ML
INJECTION, SOLUTION INTRAVENOUS
Status: DISCONTINUED | OUTPATIENT
Start: 2019-07-16 | End: 2019-07-16

## 2019-07-16 RX ORDER — ONDANSETRON 2 MG/ML
8 INJECTION INTRAMUSCULAR; INTRAVENOUS EVERY 6 HOURS PRN
Status: DISCONTINUED | OUTPATIENT
Start: 2019-07-16 | End: 2019-07-17 | Stop reason: HOSPADM

## 2019-07-16 RX ORDER — DIPHENHYDRAMINE HYDROCHLORIDE 50 MG/ML
25 INJECTION INTRAMUSCULAR; INTRAVENOUS EVERY 6 HOURS PRN
Status: DISCONTINUED | OUTPATIENT
Start: 2019-07-16 | End: 2019-07-16 | Stop reason: HOSPADM

## 2019-07-16 RX ORDER — LIDOCAINE HYDROCHLORIDE 20 MG/ML
JELLY TOPICAL ONCE AS NEEDED
Status: COMPLETED | OUTPATIENT
Start: 2019-07-16 | End: 2019-07-16

## 2019-07-16 RX ORDER — SENNOSIDES 8.6 MG/1
8.6 TABLET ORAL DAILY
Status: DISCONTINUED | OUTPATIENT
Start: 2019-07-16 | End: 2019-07-17 | Stop reason: HOSPADM

## 2019-07-16 RX ORDER — SODIUM CHLORIDE 9 MG/ML
INJECTION, SOLUTION INTRAVENOUS CONTINUOUS
Status: DISCONTINUED | OUTPATIENT
Start: 2019-07-16 | End: 2019-07-17

## 2019-07-16 RX ORDER — OXYCODONE HYDROCHLORIDE 5 MG/1
5 TABLET ORAL EVERY 4 HOURS PRN
Status: DISCONTINUED | OUTPATIENT
Start: 2019-07-16 | End: 2019-07-17 | Stop reason: HOSPADM

## 2019-07-16 RX ORDER — ATORVASTATIN CALCIUM 10 MG/1
10 TABLET, FILM COATED ORAL NIGHTLY
Status: DISCONTINUED | OUTPATIENT
Start: 2019-07-16 | End: 2019-07-17 | Stop reason: HOSPADM

## 2019-07-16 RX ORDER — FUROSEMIDE 20 MG/1
20 TABLET ORAL DAILY
Status: DISCONTINUED | OUTPATIENT
Start: 2019-07-16 | End: 2019-07-17 | Stop reason: HOSPADM

## 2019-07-16 RX ORDER — HYDRALAZINE HYDROCHLORIDE 25 MG/1
25 TABLET, FILM COATED ORAL EVERY 8 HOURS
Status: DISCONTINUED | OUTPATIENT
Start: 2019-07-16 | End: 2019-07-17 | Stop reason: HOSPADM

## 2019-07-16 RX ORDER — PANTOPRAZOLE SODIUM 40 MG/1
40 TABLET, DELAYED RELEASE ORAL DAILY
Status: DISCONTINUED | OUTPATIENT
Start: 2019-07-16 | End: 2019-07-17 | Stop reason: HOSPADM

## 2019-07-16 RX ORDER — HYDROMORPHONE HYDROCHLORIDE 1 MG/ML
0.2 INJECTION, SOLUTION INTRAMUSCULAR; INTRAVENOUS; SUBCUTANEOUS EVERY 5 MIN PRN
Status: DISCONTINUED | OUTPATIENT
Start: 2019-07-16 | End: 2019-07-16 | Stop reason: HOSPADM

## 2019-07-16 RX ORDER — LIDOCAINE HCL/PF 100 MG/5ML
SYRINGE (ML) INTRAVENOUS
Status: DISCONTINUED | OUTPATIENT
Start: 2019-07-16 | End: 2019-07-16

## 2019-07-16 RX ORDER — ONDANSETRON 2 MG/ML
INJECTION INTRAMUSCULAR; INTRAVENOUS
Status: DISCONTINUED | OUTPATIENT
Start: 2019-07-16 | End: 2019-07-16

## 2019-07-16 RX ORDER — PHENYLEPHRINE HYDROCHLORIDE 10 MG/ML
INJECTION INTRAVENOUS
Status: DISCONTINUED | OUTPATIENT
Start: 2019-07-16 | End: 2019-07-16

## 2019-07-16 RX ORDER — OXYCODONE HYDROCHLORIDE 10 MG/1
10 TABLET ORAL EVERY 4 HOURS PRN
Status: DISCONTINUED | OUTPATIENT
Start: 2019-07-16 | End: 2019-07-17 | Stop reason: HOSPADM

## 2019-07-16 RX ORDER — DOCUSATE SODIUM 100 MG/1
100 CAPSULE, LIQUID FILLED ORAL 2 TIMES DAILY
Status: DISCONTINUED | OUTPATIENT
Start: 2019-07-16 | End: 2019-07-17 | Stop reason: HOSPADM

## 2019-07-16 RX ORDER — AMLODIPINE BESYLATE 2.5 MG/1
2.5 TABLET ORAL DAILY
Status: DISCONTINUED | OUTPATIENT
Start: 2019-07-16 | End: 2019-07-17 | Stop reason: HOSPADM

## 2019-07-16 RX ORDER — ACETAMINOPHEN 325 MG/1
650 TABLET ORAL EVERY 6 HOURS PRN
Status: DISCONTINUED | OUTPATIENT
Start: 2019-07-16 | End: 2019-07-17 | Stop reason: HOSPADM

## 2019-07-16 RX ORDER — MIDAZOLAM HYDROCHLORIDE 1 MG/ML
INJECTION, SOLUTION INTRAMUSCULAR; INTRAVENOUS
Status: DISCONTINUED | OUTPATIENT
Start: 2019-07-16 | End: 2019-07-16

## 2019-07-16 RX ORDER — FENTANYL CITRATE 50 UG/ML
INJECTION, SOLUTION INTRAMUSCULAR; INTRAVENOUS
Status: DISCONTINUED | OUTPATIENT
Start: 2019-07-16 | End: 2019-07-16

## 2019-07-16 RX ORDER — FENTANYL CITRATE 50 UG/ML
25 INJECTION, SOLUTION INTRAMUSCULAR; INTRAVENOUS EVERY 5 MIN PRN
Status: DISCONTINUED | OUTPATIENT
Start: 2019-07-16 | End: 2019-07-16 | Stop reason: HOSPADM

## 2019-07-16 RX ORDER — HYOSCYAMINE SULFATE 0.12 MG/1
0.12 TABLET SUBLINGUAL EVERY 4 HOURS PRN
Status: DISCONTINUED | OUTPATIENT
Start: 2019-07-16 | End: 2019-07-17

## 2019-07-16 RX ORDER — TAMSULOSIN HYDROCHLORIDE 0.4 MG/1
0.4 CAPSULE ORAL NIGHTLY
Status: DISCONTINUED | OUTPATIENT
Start: 2019-07-16 | End: 2019-07-17 | Stop reason: HOSPADM

## 2019-07-16 RX ORDER — PROPOFOL 10 MG/ML
VIAL (ML) INTRAVENOUS
Status: DISCONTINUED | OUTPATIENT
Start: 2019-07-16 | End: 2019-07-16

## 2019-07-16 RX ORDER — CEFAZOLIN SODIUM 1 G/3ML
2 INJECTION, POWDER, FOR SOLUTION INTRAMUSCULAR; INTRAVENOUS
Status: COMPLETED | OUTPATIENT
Start: 2019-07-16 | End: 2019-07-16

## 2019-07-16 RX ADMIN — MIDAZOLAM HYDROCHLORIDE 2 MG: 1 INJECTION, SOLUTION INTRAMUSCULAR; INTRAVENOUS at 06:07

## 2019-07-16 RX ADMIN — HYDRALAZINE HYDROCHLORIDE 25 MG: 25 TABLET, FILM COATED ORAL at 10:07

## 2019-07-16 RX ADMIN — DOCUSATE SODIUM 100 MG: 100 CAPSULE, LIQUID FILLED ORAL at 08:07

## 2019-07-16 RX ADMIN — OXYCODONE HYDROCHLORIDE 10 MG: 10 TABLET ORAL at 10:07

## 2019-07-16 RX ADMIN — SODIUM CHLORIDE: 0.9 INJECTION, SOLUTION INTRAVENOUS at 07:07

## 2019-07-16 RX ADMIN — ONDANSETRON 4 MG: 2 INJECTION INTRAMUSCULAR; INTRAVENOUS at 07:07

## 2019-07-16 RX ADMIN — OXYCODONE HYDROCHLORIDE 5 MG: 5 TABLET ORAL at 06:07

## 2019-07-16 RX ADMIN — ATORVASTATIN CALCIUM 10 MG: 10 TABLET, FILM COATED ORAL at 08:07

## 2019-07-16 RX ADMIN — FENTANYL CITRATE 50 MCG: 50 INJECTION, SOLUTION INTRAMUSCULAR; INTRAVENOUS at 07:07

## 2019-07-16 RX ADMIN — PROPOFOL 150 MG: 10 INJECTION, EMULSION INTRAVENOUS at 07:07

## 2019-07-16 RX ADMIN — PHENYLEPHRINE HYDROCHLORIDE 100 MCG: 10 INJECTION INTRAVENOUS at 07:07

## 2019-07-16 RX ADMIN — ROCURONIUM BROMIDE 50 MG: 10 INJECTION, SOLUTION INTRAVENOUS at 07:07

## 2019-07-16 RX ADMIN — AMLODIPINE BESYLATE 2.5 MG: 2.5 TABLET ORAL at 10:07

## 2019-07-16 RX ADMIN — OXYCODONE HYDROCHLORIDE 10 MG: 10 TABLET ORAL at 09:07

## 2019-07-16 RX ADMIN — CEFAZOLIN 2 G: 330 INJECTION, POWDER, FOR SOLUTION INTRAMUSCULAR; INTRAVENOUS at 07:07

## 2019-07-16 RX ADMIN — PANTOPRAZOLE SODIUM 40 MG: 40 TABLET, DELAYED RELEASE ORAL at 10:07

## 2019-07-16 RX ADMIN — LIDOCAINE HYDROCHLORIDE 10 ML: 20 JELLY TOPICAL at 09:07

## 2019-07-16 RX ADMIN — LIDOCAINE HYDROCHLORIDE 60 MG: 20 INJECTION, SOLUTION INTRAVENOUS at 07:07

## 2019-07-16 RX ADMIN — SODIUM CHLORIDE: 0.9 INJECTION, SOLUTION INTRAVENOUS at 06:07

## 2019-07-16 RX ADMIN — SUGAMMADEX 200 MG: 100 INJECTION, SOLUTION INTRAVENOUS at 08:07

## 2019-07-16 RX ADMIN — SODIUM CHLORIDE: 0.9 INJECTION, SOLUTION INTRAVENOUS at 10:07

## 2019-07-16 RX ADMIN — FUROSEMIDE 20 MG: 20 TABLET ORAL at 10:07

## 2019-07-16 RX ADMIN — HYDRALAZINE HYDROCHLORIDE 25 MG: 25 TABLET, FILM COATED ORAL at 01:07

## 2019-07-16 RX ADMIN — TAMSULOSIN HYDROCHLORIDE 0.4 MG: 0.4 CAPSULE ORAL at 08:07

## 2019-07-16 RX ADMIN — OXYCODONE HYDROCHLORIDE 10 MG: 10 TABLET ORAL at 02:07

## 2019-07-16 RX ADMIN — SENNOSIDES 8.6 MG: 8.6 TABLET, FILM COATED ORAL at 08:07

## 2019-07-16 RX ADMIN — DOCUSATE SODIUM 100 MG: 100 CAPSULE, LIQUID FILLED ORAL at 10:07

## 2019-07-16 NOTE — PLAN OF CARE
Report called to NASIM Arroyo on 5th floor. VSS, NADN, c/o pain to urethral meatus, oxycodone 10 mg given, lidocaine jelly applied to meatus. NS infusing to left hand 18 g PIV, dressing CDI.  Transport requested.

## 2019-07-16 NOTE — PROGRESS NOTES
Pacemaker clinic notified, no re-programming recommended. Pt ventricular paced at 75 bpm to control a-fib.

## 2019-07-16 NOTE — INTERVAL H&P NOTE
The patient has been examined and the H&P has been reviewed:    I concur with the findings and no changes have occurred since H&P was written.     Off coumadin for 5 days, off asa for >5 days. UA negative for all components.     Anesthesia/Surgery risks, benefits and alternative options discussed and understood by patient/family.          Active Hospital Problems    Diagnosis  POA    BPH with urinary obstruction [N40.1, N13.8]  Yes      Resolved Hospital Problems   No resolved problems to display.

## 2019-07-16 NOTE — OP NOTE
[facility]  Operative Note    Date: 07/16/2019    Pre-Op Diagnosis: BPH with bladder outlet obstruction    Patient Active Problem List   Diagnosis    BPH with urinary obstruction    Hypertension    Hyperlipemia    Atrial fibrillation    Erectile dysfunction due to arterial insufficiency       Post-Op Diagnosis: same    Procedure(s) Performed:   TURP    Specimen(s): prostate chips    Staff Surgeon: Fermin Lozada MD    Assistant Surgeon: Matthieu Allison MD    Anesthesia: General endotracheal anesthesia    Indications: Lakeshia Luna is a 74 y.o. male with history of atrial fibrillation and cardiac stents with a pacemaker and on eliquis who has refractory LUTS after a Rezum procedure 6 months ago. 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     Findings:   - Prostate with evidence of scar from previous Rezum therapy. Residual apical tissue with some residual lateral lobe tissue. This was resected to capsule. Open channel at procedure's end.     Estimated Blood Loss: 20cc    Drains: 26 Fr 3-way  catheter    Procedure in detail:  After the risks and benefits of the procedure were explained, consent was obtained, and the patient was taken to the operating suite and placed in the supine position. Anesthesia was administered. When adequately sedated, the patient was placed in dorsal lithotomy position and prepped and draped in normal sterile fashion. Timeout was performed and preoperative abx were confirmed.      A 28-[place] sheath resectoscope was placed into the bladder using a visual obturator. The visual obturator was exchanged for the resecting mechanism. The ureteral orifices were identified and safely marked with the resecting loop. The median lobe was first resected with care to avoid the ureteral orifices. Once the median lobe was resected, we then turned our attention to the left lateral lobe of the prostate.The left lateral lobe was then  resected in a stepwise fashion from 5 o'clock to 12 o'clock with care to leave the verumontanum and sphincter intact. Once this was performed we directed our attention to the right lobe of the prostate and again the lateral lobe was resected from the 7 o'clock to the 12 o'clock position. Additional obstructing apical tissue was carefully resected. Hemostasis was then achieved.    The ureteral orifices, verumontanum and sphincter were intact. The [employer] evacuator was used to remove all prostate chips and again hemostasis was obtained.     Once the bladder was drained, we could see that he had an open channel and the scope was removed.  A 26 Fr 3-way catheter was placed in the bladder with 50 mL of water in the balloon. The patient was placed on traction and on CBI. The patient was transferred to recovery in stable condition.     Disposition: The patient will remain on the urology service overnight for observation and CBI will be titrated.    Matthieu Allison MD

## 2019-07-16 NOTE — TRANSFER OF CARE
Anesthesia Transfer of Care Note    Patient: Lakeshia Luna    Procedure(s) Performed: Procedure(s) (LRB):  TURP (TRANSURETHRAL RESECTION OF PROSTATE) (N/A)    Patient location: PACU    Anesthesia Type: general    Transport from OR: Transported from OR on 6-10 L/min O2 by face mask with adequate spontaneous ventilation    Post pain: adequate analgesia    Post assessment: no apparent anesthetic complications    Post vital signs: stable    Level of consciousness: awake    Nausea/Vomiting: no nausea/vomiting    Complications: none    Transfer of care protocol was followed      Last vitals:   Visit Vitals  BP (!) 150/81   Pulse 75   Temp 36.7 °C (98.1 °F) (Temporal)   Resp 18   Ht 6' (1.829 m)   Wt 95.3 kg (210 lb)   SpO2 98%   BMI 28.48 kg/m²

## 2019-07-16 NOTE — ANESTHESIA POSTPROCEDURE EVALUATION
Anesthesia Post Evaluation    Patient: Lakeshia Luna    Procedure(s) Performed: Procedure(s) (LRB):  TURP (TRANSURETHRAL RESECTION OF PROSTATE) (N/A)    Final Anesthesia Type: general  Patient location during evaluation: PACU  Patient participation: Yes- Able to Participate  Level of consciousness: awake and alert  Post-procedure vital signs: reviewed and stable  Pain management: adequate  Airway patency: patent  PONV status at discharge: No PONV  Anesthetic complications: no      Cardiovascular status: hemodynamically stable  Respiratory status: unassisted  Hydration status: euvolemic  Follow-up not needed.          Vitals Value Taken Time   /91 7/16/2019 10:22 AM   Temp 35.6 °C (96.1 °F) 7/16/2019 10:22 AM   Pulse 74 7/16/2019 10:22 AM   Resp 18 7/16/2019 10:22 AM   SpO2 98 % 7/16/2019 10:22 AM         No case tracking events are documented in the log.      Pain/Ronal Score: Pain Rating Prior to Med Admin: 7 (7/16/2019  9:27 AM)  Ronal Score: 10 (7/16/2019  9:30 AM)

## 2019-07-16 NOTE — PROGRESS NOTES
Arrhythmia Clinic  Pacemaker-unknown company    Called from pre-op for re-programming recommendations for patient going for a TURP with a pacemaker in situ.    Recommendations: Procedure is below the waist, thus no reprogramming needed.     Please call [phone] for any further assistance.

## 2019-07-17 VITALS
SYSTOLIC BLOOD PRESSURE: 151 MMHG | DIASTOLIC BLOOD PRESSURE: 73 MMHG | WEIGHT: 210 LBS | TEMPERATURE: 99 F | HEART RATE: 76 BPM | HEIGHT: 72 IN | BODY MASS INDEX: 28.44 KG/M2 | OXYGEN SATURATION: 93 % | RESPIRATION RATE: 20 BRPM

## 2019-07-17 PROBLEM — N99.89 POSTOPERATIVE URINARY RETENTION: Status: ACTIVE | Noted: 2019-07-17

## 2019-07-17 PROBLEM — R33.8 POSTOPERATIVE URINARY RETENTION: Status: ACTIVE | Noted: 2019-07-17

## 2019-07-17 LAB
ANION GAP SERPL CALC-SCNC: 6 MMOL/L (ref 8–16)
BASOPHILS # BLD AUTO: 0.03 K/UL (ref 0–0.2)
BASOPHILS NFR BLD: 0.4 % (ref 0–1.9)
BUN SERPL-MCNC: 10 MG/DL (ref 8–23)
CALCIUM SERPL-MCNC: 8.4 MG/DL (ref 8.7–10.5)
CHLORIDE SERPL-SCNC: 106 MMOL/L (ref 95–110)
CO2 SERPL-SCNC: 27 MMOL/L (ref 23–29)
CREAT SERPL-MCNC: 1 MG/DL (ref 0.5–1.4)
DIFFERENTIAL METHOD: ABNORMAL
EOSINOPHIL # BLD AUTO: 0.2 K/UL (ref 0–0.5)
EOSINOPHIL NFR BLD: 2.2 % (ref 0–8)
ERYTHROCYTE [DISTWIDTH] IN BLOOD BY AUTOMATED COUNT: 14.1 % (ref 11.5–14.5)
EST. GFR  (AFRICAN AMERICAN): >60 ML/MIN/1.73 M^2
EST. GFR  (NON AFRICAN AMERICAN): >60 ML/MIN/1.73 M^2
GLUCOSE SERPL-MCNC: 86 MG/DL (ref 70–110)
HCT VFR BLD AUTO: 40 % (ref 40–54)
HGB BLD-MCNC: 12.7 G/DL (ref 14–18)
IMM GRANULOCYTES # BLD AUTO: 0.04 K/UL (ref 0–0.04)
IMM GRANULOCYTES NFR BLD AUTO: 0.5 % (ref 0–0.5)
LYMPHOCYTES # BLD AUTO: 1.7 K/UL (ref 1–4.8)
LYMPHOCYTES NFR BLD: 22.3 % (ref 18–48)
MCH RBC QN AUTO: 30.4 PG (ref 27–31)
MCHC RBC AUTO-ENTMCNC: 31.8 G/DL (ref 32–36)
MCV RBC AUTO: 96 FL (ref 82–98)
MONOCYTES # BLD AUTO: 0.7 K/UL (ref 0.3–1)
MONOCYTES NFR BLD: 8.9 % (ref 4–15)
NEUTROPHILS # BLD AUTO: 4.9 K/UL (ref 1.8–7.7)
NEUTROPHILS NFR BLD: 65.7 % (ref 38–73)
NRBC BLD-RTO: 0 /100 WBC
PLATELET # BLD AUTO: 140 K/UL (ref 150–350)
PMV BLD AUTO: 11.1 FL (ref 9.2–12.9)
POTASSIUM SERPL-SCNC: 4.4 MMOL/L (ref 3.5–5.1)
RBC # BLD AUTO: 4.18 M/UL (ref 4.6–6.2)
SODIUM SERPL-SCNC: 139 MMOL/L (ref 136–145)
WBC # BLD AUTO: 7.41 K/UL (ref 3.9–12.7)

## 2019-07-17 PROCEDURE — 85025 COMPLETE CBC W/AUTO DIFF WBC: CPT

## 2019-07-17 PROCEDURE — 25000003 PHARM REV CODE 250: Performed by: STUDENT IN AN ORGANIZED HEALTH CARE EDUCATION/TRAINING PROGRAM

## 2019-07-17 PROCEDURE — 36415 COLL VENOUS BLD VENIPUNCTURE: CPT

## 2019-07-17 PROCEDURE — 80048 BASIC METABOLIC PNL TOTAL CA: CPT

## 2019-07-17 RX ORDER — HYDROCODONE BITARTRATE AND ACETAMINOPHEN 5; 325 MG/1; MG/1
1 TABLET ORAL EVERY 6 HOURS PRN
Qty: 11 TABLET | Refills: 0 | Status: SHIPPED | OUTPATIENT
Start: 2019-07-17 | End: 2019-07-27

## 2019-07-17 RX ORDER — BISACODYL 10 MG
10 SUPPOSITORY, RECTAL RECTAL DAILY PRN
Status: DISCONTINUED | OUTPATIENT
Start: 2019-07-17 | End: 2019-07-17 | Stop reason: HOSPADM

## 2019-07-17 RX ADMIN — DOCUSATE SODIUM 100 MG: 100 CAPSULE, LIQUID FILLED ORAL at 08:07

## 2019-07-17 RX ADMIN — HYDRALAZINE HYDROCHLORIDE 25 MG: 25 TABLET, FILM COATED ORAL at 05:07

## 2019-07-17 RX ADMIN — PANTOPRAZOLE SODIUM 40 MG: 40 TABLET, DELAYED RELEASE ORAL at 08:07

## 2019-07-17 RX ADMIN — AMLODIPINE BESYLATE 2.5 MG: 2.5 TABLET ORAL at 08:07

## 2019-07-17 RX ADMIN — SENNOSIDES 8.6 MG: 8.6 TABLET, FILM COATED ORAL at 08:07

## 2019-07-17 RX ADMIN — BISACODYL 10 MG: 10 SUPPOSITORY RECTAL at 10:07

## 2019-07-17 RX ADMIN — OXYCODONE HYDROCHLORIDE 5 MG: 5 TABLET ORAL at 06:07

## 2019-07-17 RX ADMIN — FUROSEMIDE 20 MG: 20 TABLET ORAL at 08:07

## 2019-07-17 NOTE — PROGRESS NOTES
[facility]  Urology  Progress Note    Patient Name: Lakeshia Luna  MRN: [id number]  Admission Date: 7/16/2019  Hospital Length of Stay: 0 days  Code Status: No Order   Attending Provider: Fermin Lozada MD   Primary Care Physician: Maranda Carter MD    Subjective:     HPI:  Lakeshia Luna is a 74 y.o. male with history of atrial fibrillation and cardiac stents with a pacemaker and on eliquis who has refractory LUTS after a Rezum procedure 6 months ago. 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  He is s/p bipolar TURP on 7/16/19.     Interval History:   Doing very well this morning, walked last night and today.   No pain, no n/v, tolerating diet  CBI clamped at 4am, has walked since then    Review of Systems  Objective:     Temp:  [96.1 °F (35.6 °C)-98.7 °F (37.1 °C)] 98.7 °F (37.1 °C)  Pulse:  [74-76] 76  Resp:  [15-20] 18  SpO2:  [97 %-100 %] 98 %  BP: (123-176)/() 127/74     Body mass index is 28.48 kg/m².           Drains     Drain                 Urethral Catheter 07/16/19 0753 Double-lumen;Latex less than 1 day                Physical Exam   Constitutional: No distress.   HENT:   Head: Normocephalic and atraumatic.   Eyes: Conjunctivae are normal. No scleral icterus.   Neck: Normal range of motion.   Cardiovascular: Normal rate.    Pulmonary/Chest: Effort normal. No respiratory distress.   Abdominal: Soft. He exhibits no distension. There is no rebound and no guarding.   Resendez draining light pink  CBI clamped   Musculoskeletal: Normal range of motion.   SCDs in place   Neurological: He is alert.   Skin: Skin is warm and dry. He is not diaphoretic.     Psychiatric: He has a normal mood and affect.       Significant Labs:    BMP:  Recent Labs   Lab 07/10/19  1106 07/17/19  0428    139   K 4.5 4.4    106   CO2 26 27   BUN 20 10   CREATININE 1.3 1.0   CALCIUM 9.6 8.4*       CBC:   Recent Labs   Lab 07/10/19  1106 07/17/19  0427    WBC  --  7.41   HGB 14.8 12.7*   HCT  --  40.0   PLT  --  140*       All pertinent labs results from the past 24 hours have been reviewed.    Significant Imaging:  All pertinent imaging results/findings from the past 24 hours have been reviewed.                  Assessment/Plan:     * BPH with urinary obstruction  - po tylenol, PO oxycodone for pain control  - regular diet  - MIVF  - CBC, BMP in am  - Ambulate pm of surgery and qid  - Removed patient's  catheter, will perform voiding trial with string of bottles  - Can restart coumadin today  - Prophylaxis: IS, SCDs, GI ppx  - Home after voiding trial as long as his urine continues to stay relatively clear          VTE Risk Mitigation (From admission, onward)        Ordered     Place sequential compression device  Until discontinued      07/16/19 0836     Place sequential compression device  Until discontinued      07/16/19 0520     Place PACO hose  Until discontinued      07/16/19 0520          Matthieu Allison MD  Urology  [facility]

## 2019-07-17 NOTE — ASSESSMENT & PLAN NOTE
- po tylenol, PO oxycodone for pain control  - regular diet  - MIVF  - CBC, BMP in am  - Ambulate pm of surgery and qid  - Removed patient's  catheter, will perform voiding trial with string of bottles  - Can restart coumadin today  - Prophylaxis: IS, SCDs, GI ppx  - Home after voiding trial as long as his urine continues to stay relatively clear

## 2019-07-17 NOTE — SUBJECTIVE & OBJECTIVE
Interval History:   Doing very well this morning, walked last night and today.   No pain, no n/v, tolerating diet  CBI clamped at 4am, has walked since then    Review of Systems  Objective:     Temp:  [96.1 °F (35.6 °C)-98.7 °F (37.1 °C)] 98.7 °F (37.1 °C)  Pulse:  [74-76] 76  Resp:  [15-20] 18  SpO2:  [97 %-100 %] 98 %  BP: (123-176)/() 127/74     Body mass index is 28.48 kg/m².           Drains     Drain                 Urethral Catheter 07/16/19 5181 Double-lumen;Latex less than 1 day                Physical Exam   Constitutional: No distress.   HENT:   Head: Normocephalic and atraumatic.   Eyes: Conjunctivae are normal. No scleral icterus.   Neck: Normal range of motion.   Cardiovascular: Normal rate.    Pulmonary/Chest: Effort normal. No respiratory distress.   Abdominal: Soft. He exhibits no distension. There is no rebound and no guarding.   Resendez draining light pink  CBI clamped   Musculoskeletal: Normal range of motion.   SCDs in place   Neurological: He is alert.   Skin: Skin is warm and dry. He is not diaphoretic.     Psychiatric: He has a normal mood and affect.       Significant Labs:    BMP:  Recent Labs   Lab 07/10/19  1106 07/17/19  0428    139   K 4.5 4.4    106   CO2 26 27   BUN 20 10   CREATININE 1.3 1.0   CALCIUM 9.6 8.4*       CBC:   Recent Labs   Lab 07/10/19  1106 07/17/19  0428   WBC  --  7.41   HGB 14.8 12.7*   HCT  --  40.0   PLT  --  140*       All pertinent labs results from the past 24 hours have been reviewed.    Significant Imaging:  All pertinent imaging results/findings from the past 24 hours have been reviewed.

## 2019-07-17 NOTE — PLAN OF CARE
Problem: Adult Inpatient Plan of Care  Goal: Plan of Care Review  Outcome: Ongoing (interventions implemented as appropriate)  Pt CBI flowing moderately/slow,  has clear pink/red urine no clots, pt ambulated halls x2, no falls noted, VS as charted.

## 2019-07-17 NOTE — HPI
Lakeshia Luna is a 74 y.o. male with history of atrial fibrillation and cardiac stents with a pacemaker and on eliquis who has refractory LUTS after a Rezum procedure 6 months ago. 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 He is s/p bipolar TURP on 7/16/19.

## 2019-07-17 NOTE — NURSING
Discharged instructions reviewed with pt and spouse. Pt understood all instructions. All questions and concerns answered. Pt discharged off floor via wheelchair.

## 2019-07-17 NOTE — DISCHARGE SUMMARY
[facility]  Urology  Discharge Summary      Patient Name: Lakeshia Luna  MRN: [id number]  Admission Date: 7/16/2019  Hospital Length of Stay: 0 days  Discharge Date and Time:  07/17/2019 3:12 PM  Attending Physician: Fermin Lozada MD   Discharging Provider: Matthieu Allison MD  Primary Care Physician: Maranda Carter MD    HPI: Lakeshia Luna is a 74 y.o. male with history of atrial fibrillation and cardiac stents with a pacemaker and on eliquis who has refractory LUTS after a Rezum procedure 6 months ago. 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  He is s/p bipolar TURP on 7/16/19.     Procedure(s) (LRB):  TURP (TRANSURETHRAL RESECTION OF PROSTATE) (N/A)     Indwelling Lines/Drains at time of discharge:   Lines/Drains/Airways          None          Hospital Course (synopsis of major diagnoses, care, treatment, and services provided during the course of the hospital stay): Pt presented to [facility] for traditional TURP procedure. Pt tolerated the procedure well in its entirety without issue.  For more details, please refer to op note. Post-op, pt was transferred to PACU and started on CBI, titrated to light pink urine. Once stable, patient was transferred to the floor. Pt was started on regular diet and tolerated it well.  Pain was controlled with PO analgesics. On POD1, patient's CBI was clamped and he walked through the halls. His catheter was removed he was able to void suffuciently.  Pt stable for discharge.    Consults: none    Significant Diagnostic Studies: none    Pending Diagnostic Studies:     None          Final Active Diagnoses:    Diagnosis Date Noted POA    PRINCIPAL PROBLEM:  BPH with urinary obstruction [N40.1, N13.8] 01/22/2019 Yes    Erectile dysfunction due to arterial insufficiency [N52.01] 05/16/2019 Yes    Hypertension [I10]  Yes    Hyperlipemia [E78.5]  Yes    Atrial fibrillation [I48.91]  Yes      Problems Resolved During this  Admission:       Discharged Condition: good    Disposition: Home or Self Care    Follow Up:  Follow-up Information     Fermin Lozada MD On 10/21/2019.    Specialty:  Urology  Why:  post op check  Contact information:  [address]  [place] [id number] [phone]             Follow up On 10/21/2019.    Why:  Post-op follow-up.               Patient Instructions:      Call MD for:  persistent nausea and vomiting or diarrhea     Call MD for:  severe uncontrolled pain     Call MD for:  persistent dizziness, light-headedness, or visual disturbances     Call MD for:   Order Comments: Temperature > 101F     Medications:  Reconciled Home Medications:      Medication List      START taking these medications    HYDROcodone-acetaminophen 5-325 mg per tablet  Commonly known as:  NORCO  Take 1 tablet by mouth every 6 (six) hours as needed for Pain.        CONTINUE taking these medications    aspirin 81 MG EC tablet  Commonly known as:  ECOTRIN  Take 81 mg by mouth once daily.     co-enzyme Q-10 50 mg capsule  Take 100 mg by mouth once daily.     docusate sodium 100 MG capsule  Commonly known as:  COLACE  Take 100 mg by mouth 2 (two) times daily.     furosemide 20 MG tablet  Commonly known as:  LASIX     hydrALAZINE 25 MG tablet  Commonly known as:  APRESOLINE  Take 25 mg by mouth every 8 (eight) hours.     LIPITOR 10 MG tablet  Generic drug:  atorvastatin  Take 10 mg by mouth every evening.     NORVASC 2.5 MG tablet  Generic drug:  amLODIPine  Norvasc 2.5 mg tablet   Take 1 tablet every day by oral route.     potassium chloride 8 mEq Cpsr  Commonly known as:  MICRO-K  Take 8 mEq by mouth once daily.     tamsulosin 0.4 mg Cap  Commonly known as:  FLOMAX  Take 0.4 mg by mouth every evening.     warfarin 5 MG tablet  Commonly known as:  COUMADIN  Take 5 mg by mouth Daily.        STOP taking these medications    sulfamethoxazole-trimethoprim 800-160mg 800-160 mg Tab  Commonly known as:  BACTRIM DS            Time spent  on the discharge of patient: 15 minutes    Matthieu Allison MD  Urology  [facility]

## 2019-07-17 NOTE — DISCHARGE INSTRUCTIONS
OK to restart coumadin on 7/17/19.   OK to take Colace for constipation.     Post TURP instruction  Do not strain to have a bowel movement  No strenuous exercise x 7 days  No driving while you are on narcotic pain medications  catheter is in place    You can expect to see blood in your urine intermittently.    Go to the ER if:  You are having severe abdominal pain  Inability to void if you do not have a catheter    Call the doctor if:   Temperature is greater than 101F   Persistent vomiting and inability to keep food down

## 2019-07-18 ENCOUNTER — TELEPHONE (OUTPATIENT)
Dept: UROLOGY | Facility: CLINIC | Age: 74
End: 2019-07-18

## 2019-07-18 ENCOUNTER — HOSPITAL ENCOUNTER (OUTPATIENT)
Facility: HOSPITAL | Age: 74
LOS: 1 days | Discharge: HOME | End: 2019-07-18
Attending: UROLOGY | Admitting: UROLOGY
Payer: MEDICARE

## 2019-07-18 VITALS
WEIGHT: 210.13 LBS | HEART RATE: 75 BPM | DIASTOLIC BLOOD PRESSURE: 69 MMHG | SYSTOLIC BLOOD PRESSURE: 138 MMHG | OXYGEN SATURATION: 95 % | RESPIRATION RATE: 18 BRPM | TEMPERATURE: 97 F | HEIGHT: 73 IN | BODY MASS INDEX: 27.85 KG/M2

## 2019-07-18 DIAGNOSIS — R33.8 POSTOPERATIVE URINARY RETENTION: ICD-10-CM

## 2019-07-18 DIAGNOSIS — N99.89 POSTOPERATIVE URINARY RETENTION: ICD-10-CM

## 2019-07-18 DIAGNOSIS — N52.01 ERECTILE DYSFUNCTION DUE TO ARTERIAL INSUFFICIENCY: ICD-10-CM

## 2019-07-18 DIAGNOSIS — I48.20 CHRONIC ATRIAL FIBRILLATION: ICD-10-CM

## 2019-07-18 DIAGNOSIS — N13.8 BPH WITH URINARY OBSTRUCTION: Primary | ICD-10-CM

## 2019-07-18 DIAGNOSIS — E78.5 HYPERLIPIDEMIA, UNSPECIFIED HYPERLIPIDEMIA TYPE: ICD-10-CM

## 2019-07-18 DIAGNOSIS — I10 HYPERTENSION, UNSPECIFIED TYPE: ICD-10-CM

## 2019-07-18 DIAGNOSIS — N40.1 BPH WITH URINARY OBSTRUCTION: Primary | ICD-10-CM

## 2019-07-18 PROCEDURE — G0378 HOSPITAL OBSERVATION PER HR: HCPCS

## 2019-07-18 PROCEDURE — 25000003 PHARM REV CODE 250: Performed by: STUDENT IN AN ORGANIZED HEALTH CARE EDUCATION/TRAINING PROGRAM

## 2019-07-18 PROCEDURE — 94761 N-INVAS EAR/PLS OXIMETRY MLT: CPT

## 2019-07-18 PROCEDURE — 99900035 HC TECH TIME PER 15 MIN (STAT)

## 2019-07-18 RX ORDER — DOCUSATE SODIUM 100 MG/1
100 CAPSULE, LIQUID FILLED ORAL 2 TIMES DAILY
Status: DISCONTINUED | OUTPATIENT
Start: 2019-07-18 | End: 2019-07-18 | Stop reason: HOSPADM

## 2019-07-18 RX ORDER — HYOSCYAMINE SULFATE 0.125 MG
125 TABLET ORAL EVERY 4 HOURS PRN
Qty: 30 TABLET | Refills: 0 | OUTPATIENT
Start: 2019-07-18 | End: 2019-07-18 | Stop reason: SDUPTHER

## 2019-07-18 RX ORDER — TAMSULOSIN HYDROCHLORIDE 0.4 MG/1
0.4 CAPSULE ORAL NIGHTLY
Status: DISCONTINUED | OUTPATIENT
Start: 2019-07-18 | End: 2019-07-18 | Stop reason: HOSPADM

## 2019-07-18 RX ORDER — BISACODYL 10 MG
10 SUPPOSITORY, RECTAL RECTAL DAILY PRN
Status: DISCONTINUED | OUTPATIENT
Start: 2019-07-18 | End: 2019-07-18 | Stop reason: HOSPADM

## 2019-07-18 RX ORDER — ONDANSETRON 2 MG/ML
8 INJECTION INTRAMUSCULAR; INTRAVENOUS EVERY 6 HOURS PRN
Status: DISCONTINUED | OUTPATIENT
Start: 2019-07-18 | End: 2019-07-18 | Stop reason: HOSPADM

## 2019-07-18 RX ORDER — HYDROCODONE BITARTRATE AND ACETAMINOPHEN 5; 325 MG/1; MG/1
1 TABLET ORAL EVERY 4 HOURS PRN
Status: DISCONTINUED | OUTPATIENT
Start: 2019-07-18 | End: 2019-07-18 | Stop reason: HOSPADM

## 2019-07-18 RX ORDER — HYDRALAZINE HYDROCHLORIDE 25 MG/1
25 TABLET, FILM COATED ORAL EVERY 8 HOURS
Status: DISCONTINUED | OUTPATIENT
Start: 2019-07-18 | End: 2019-07-18 | Stop reason: HOSPADM

## 2019-07-18 RX ORDER — HYOSCYAMINE SULFATE 0.125 MG
125 TABLET ORAL EVERY 4 HOURS PRN
Qty: 30 TABLET | Refills: 0 | Status: SHIPPED | OUTPATIENT
Start: 2019-07-18 | End: 2019-07-18 | Stop reason: SDUPTHER

## 2019-07-18 RX ORDER — FAMOTIDINE 20 MG/1
20 TABLET, FILM COATED ORAL 2 TIMES DAILY
Status: DISCONTINUED | OUTPATIENT
Start: 2019-07-18 | End: 2019-07-18 | Stop reason: HOSPADM

## 2019-07-18 RX ORDER — HYOSCYAMINE SULFATE 0.125 MG
125 TABLET ORAL EVERY 4 HOURS PRN
Qty: 30 TABLET | Refills: 0 | Status: SHIPPED | OUTPATIENT
Start: 2019-07-18 | End: 2019-08-17

## 2019-07-18 RX ORDER — POLYETHYLENE GLYCOL 3350 17 G/17G
17 POWDER, FOR SOLUTION ORAL DAILY
Status: DISCONTINUED | OUTPATIENT
Start: 2019-07-18 | End: 2019-07-18 | Stop reason: HOSPADM

## 2019-07-18 RX ORDER — SODIUM CHLORIDE 9 MG/ML
INJECTION, SOLUTION INTRAVENOUS CONTINUOUS
Status: DISCONTINUED | OUTPATIENT
Start: 2019-07-18 | End: 2019-07-18 | Stop reason: HOSPADM

## 2019-07-18 RX ADMIN — DOCUSATE SODIUM 100 MG: 100 CAPSULE, LIQUID FILLED ORAL at 08:07

## 2019-07-18 RX ADMIN — FAMOTIDINE 20 MG: 20 TABLET, FILM COATED ORAL at 08:07

## 2019-07-18 RX ADMIN — HYDRALAZINE HYDROCHLORIDE 25 MG: 25 TABLET, FILM COATED ORAL at 05:07

## 2019-07-18 RX ADMIN — SODIUM CHLORIDE: 0.9 INJECTION, SOLUTION INTRAVENOUS at 03:07

## 2019-07-18 RX ADMIN — POLYETHYLENE GLYCOL 3350 17 G: 17 POWDER, FOR SOLUTION ORAL at 08:07

## 2019-07-18 RX ADMIN — TAMSULOSIN HYDROCHLORIDE 0.4 MG: 0.4 CAPSULE ORAL at 03:07

## 2019-07-18 NOTE — PROGRESS NOTES
[facility]  Urology  Progress Note    Patient Name: Lakeshia Luna  MRN: [id number]  Admission Date: 7/18/2019  Hospital Length of Stay: 0 days  Code Status: No Order   Attending Provider: Sandro Sherman MD   Primary Care Physician: Maranda Carter MD    Subjective:     HPI:  Lakeshia Luna is a 74 y.o. male who is POD#2 from a bipolar TURP transferred to [facility] for clot retention. The patient underwent TURP on 7/16. On POD#1 his CBI was clamped and his urine remained clear light pink after walking. His  was removed and he was able to void multiple times measuring 1.2 L during the day. Urine character was medium red without clots and satisfactory for discharge. On his way home he began having lower abdominal cramping and sensation of urgency. He went to a nearby ER and had a catheter placed. He is unsure how much was on bladder scan or how much drained initially. He said it required significant irrigation to remove clots and ultimately was cleared and started on CBI. He was transferred to [facility] for further management    Interval History:   Readmitted last night due to clot retention. Was previously irrigated at outside hospital. Here, required minimal irrigation to reach clear light pink urine. Was reconnected to CBI on slow drip.   Clamped CBI this morning.     Objective:     Temp:  [96.6 °F (35.9 °C)-99 °F (37.2 °C)] 97.2 °F (36.2 °C)  Pulse:  [75-80] 75  Resp:  [17-20] 18  SpO2:  [93 %-98 %] 97 %  BP: (143-165)/(72-82) 153/79     Body mass index is 27.71 kg/m².           Drains     Drain                 Urethral Catheter 07/18/19 0226 22 Fr. less than 1 day                Physical Exam   Nursing note and vitals reviewed.  Constitutional: He is oriented to person, place, and time. He appears well-developed and well-nourished. No distress.   Cardiovascular: Normal rate.    Pulmonary/Chest: Effort normal. No accessory muscle usage. No respiratory distress.   Abdominal: Soft. He exhibits no distension.  There is no tenderness.   Genitourinary:   Genitourinary Comments: 22 3-way  in place CBI connected and clamped, very light pink urine in tubing    Musculoskeletal: He exhibits no edema.   Neurological: He is alert and oriented to person, place, and time.       Significant Labs:    BMP:  Recent Labs   Lab 07/17/19 0428      K 4.4      CO2 27   BUN 10   CREATININE 1.0   CALCIUM 8.4*       CBC:  Recent Labs   Lab 07/17/19 0428   WBC 7.41   HGB 12.7*   HCT 40.0   *       All pertinent labs results from the past 24 hours have been reviewed.    Significant Imaging:  All pertinent imaging results/findings from the past 24 hours have been reviewed.                    Assessment/Plan:     BPH with urinary obstruction  22 Fr 3-way irrigated with return of a few small clots then was very light pink with small amount of blush. Balloon taken down and continued irrigation then re-inflated to 50 cc. CBI reconnected and turned on slow drip with urine draining clear light pink.    - CBI clamped, will recheck later this AM   - regular diet  - DC IVF   - restart home meds  - ambulate QID  - PACO/SCDs/GI ppx          Kaley Tracy MD  Urology  [facility]

## 2019-07-18 NOTE — SUBJECTIVE & OBJECTIVE
Interval History:   Readmitted last night due to clot retention. Was previously irrigated at outside hospital. Here, required minimal irrigation to reach clear light pink urine. Was reconnected to CBI on slow drip.   Clamped CBI this morning.     Objective:     Temp:  [96.6 °F (35.9 °C)-99 °F (37.2 °C)] 97.2 °F (36.2 °C)  Pulse:  [75-80] 75  Resp:  [17-20] 18  SpO2:  [93 %-98 %] 97 %  BP: (143-165)/(72-82) 153/79     Body mass index is 27.71 kg/m².           Drains     Drain                 Urethral Catheter 07/18/19 0226 22 Fr. less than 1 day                Physical Exam   Nursing note and vitals reviewed.  Constitutional: He is oriented to person, place, and time. He appears well-developed and well-nourished. No distress.   Cardiovascular: Normal rate.    Pulmonary/Chest: Effort normal. No accessory muscle usage. No respiratory distress.   Abdominal: Soft. He exhibits no distension. There is no tenderness.   Genitourinary:   Genitourinary Comments: 22 3-way  in place CBI connected and clamped, very light pink urine in tubing    Musculoskeletal: He exhibits no edema.   Neurological: He is alert and oriented to person, place, and time.       Significant Labs:    BMP:  Recent Labs   Lab 07/17/19 0428      K 4.4      CO2 27   BUN 10   CREATININE 1.0   CALCIUM 8.4*       CBC:  Recent Labs   Lab 07/17/19 0428   WBC 7.41   HGB 12.7*   HCT 40.0   *       All pertinent labs results from the past 24 hours have been reviewed.    Significant Imaging:  All pertinent imaging results/findings from the past 24 hours have been reviewed.

## 2019-07-18 NOTE — PLAN OF CARE
07/18/19 1050   Discharge Assessment   Assessment Type Discharge Planning Assessment   Confirmed/corrected address and phone number on facesheet? Yes   Assessment information obtained from? Patient;Medical Record   Expected Length of Stay (days) 1   Communicated expected length of stay with patient/caregiver yes   Prior to hospitilization cognitive status: Alert/Oriented   Prior to hospitalization functional status: Independent   Current cognitive status: Alert/Oriented   Current Functional Status: Independent;Assistive Equipment   Facility Arrived From: [facility]   Lives With alone   Able to Return to Prior Arrangements yes   Is patient able to care for self after discharge? Yes   Who are your caregiver(s) and their phone number(s)? daughter Constant Fall [phone]   Patient's perception of discharge disposition home or selfcare   Patient currently being followed by outpatient case management? No   Patient currently receives any other outside agency services? No   Equipment Currently Used at Home other (see comments)  ( catheter supplies/leg bag)   Do you have any problems affording any of your prescribed medications? No   Is the patient taking medications as prescribed? yes   Does the patient have transportation home? Yes   Transportation Anticipated family or friend will provide   Does the patient receive services at the [facility]? No   Discharge Plan A Home   Discharge Plan B Home with family   DME Needed Upon Discharge  other (see comments)  ( catheter supplies/leg bag)   Patient/Family in Agreement with Plan yes   Does the patient have transportation to healthcare appointments? Yes     Met with patient in room 523B. The patient remains with CBI via three way  catheter. The patient will discharge to home w//leg bag to be removed in clinic. Possible discharge later today. Explained MILLER to patient, copy given to patient and original palced in blue chart for scan to  medical records. The patient is independent with ADL's and has transportation. No other discharge needs assessed at this time. Possible discharge later this afternoon. Blue chart given to patient and  contact information given to patient.        [facility] - [place], [place] - [address]  [address]  [facility] [address]  Phone: [phone] Fax: [phone]    [address] [facility]  [address] [facility] [address]  Phone: [phone] Fax: [phone]    PCP  Maranda Carter MD   [phone] phone  [phone] fax    Payor: MEDICARE / Plan: MEDICARE PART A & B / Product Type: Government /     BCBS is secondary

## 2019-07-18 NOTE — H&P
[facility]  Urology  History & Physical    Patient Name: Lakeshia Luna  MRN: [id number]  Admission Date: 7/18/2019  Code Status: No Order   Attending Provider: Sandro Sherman MD   Primary Care Physician: Maranda Carter MD  Principal Problem:<principal problem not specified>    Subjective:     HPI:  Lakeshia Luna is a 74 y.o. male who is POD#2 from a bipolar TURP transferred to [facility] for clot retention. The patient underwent TURP on 7/16. On POD#1 his CBI was clamped and his urine remained clear light pink after walking. His  was removed and he was able to void multiple times measuring 1.2 L during the day. Urine character was medium red without clots and satisfactory for discharge. On his way home he began having lower abdominal cramping and sensation of urgency. He went to a nearby ER and had a catheter placed. He is unsure how much was on bladder scan or how much drained initially. He said it required significant irrigation to remove clots and ultimately was cleared and started on CBI. He was transferred to [facility] for further management    Past Medical History:   Diagnosis Date    Atrial fibrillation     BPH with urinary obstruction     Hyperlipemia     Hypertension        Past Surgical History:   Procedure Laterality Date    BACK SURGERY      CARDIAC PACEMAKER PLACEMENT      DESTRUCTION, PROSTATE, TRANSURETHRAL N/A 1/22/2019    [facility]    HERNIA REPAIR      NECK SURGERY      TURP (TRANSURETHRAL RESECTION OF PROSTATE) N/A 7/16/2019    [facility]    VASECTOMY         Review of patient's allergies indicates:   Allergen Reactions    Soma [carisoprodol] Shortness Of Breath    Ace inhibitors Swelling    Carvedilol      Unknown reaction    Enalapril Swelling    Felodipine Swelling    Norvasc [amlodipine] Swelling    Statins-hmg-coa reductase inhibitors Other (See Comments)     Pain, muscle pain        Family History     None          Tobacco Use    Smoking status: Former Smoker     Types: Cigarettes    Smokeless tobacco: Former User   Substance and Sexual Activity    Alcohol use: Yes    Drug use: No    Sexual activity: Not on file       Review of Systems   Constitutional: Negative for activity change, fatigue, fever and unexpected weight change.   HENT: Negative for sore throat and trouble swallowing.    Eyes: Negative for pain and discharge.   Respiratory: Negative for chest tightness and shortness of breath.    Cardiovascular: Negative for chest pain and palpitations.   Gastrointestinal: Positive for constipation. Negative for abdominal pain, diarrhea, nausea and vomiting.   Genitourinary:        As per HPI   Musculoskeletal: Negative for back pain and gait problem.   Skin: Negative for rash and wound.   Neurological: Negative for seizures and numbness.   Psychiatric/Behavioral: Negative for hallucinations. The patient is not nervous/anxious.        Objective:     Temp:  [96.6 °F (35.9 °C)-99 °F (37.2 °C)] 96.6 °F (35.9 °C)  Pulse:  [75-80] 76  Resp:  [17-20] 18  SpO2:  [93 %-98 %] 97 %  BP: (127-165)/(72-82) 156/78     There is no height or weight on file to calculate BMI.           Drains     Drain                 Urethral Catheter 07/18/19 0226 22 Fr. less than 1 day                Physical Exam   Nursing note and vitals reviewed.  Constitutional: He is oriented to person, place, and time. He appears well-developed and well-nourished. No distress.   Cardiovascular: Normal rate.    Pulmonary/Chest: Effort normal. No accessory muscle usage. No respiratory distress.   Abdominal: Soft. He exhibits no distension. There is no tenderness.   Genitourinary:   Genitourinary Comments: 22 3-way  in place CBI connected and clamped, medium red in tubing   Musculoskeletal: He exhibits no edema.   Neurological: He is alert and oriented to person, place, and time.       Significant Labs:    BMP:  Recent Labs    Lab 07/17/19 0428      K 4.4      CO2 27   BUN 10   CREATININE 1.0   CALCIUM 8.4*       CBC:  Recent Labs   Lab 07/17/19 0428   WBC 7.41   HGB 12.7*   HCT 40.0   *       All pertinent labs results from the past 24 hours have been reviewed.    Significant Imaging:  All pertinent imaging results/findings from the past 24 hours have been reviewed.                    Assessment and Plan:     BPH with urinary obstruction  22 Fr 3-way irrigated with return of a few small clots then was very light pink with small amount of blush. Balloon taken down and continued irrigation then re-inflated to 50 cc. CBI reconnected and turned on slow drip with urine draining clear light pink.    - Admit to urology  - CBI - titrate to clear light pink for now  - regular diet  - restart home meds  - ambulate QID  - PACO/SCDs/GI ppx          VTE Risk Mitigation (From admission, onward)        Ordered     IP VTE HIGH RISK PATIENT  Once      07/18/19 0235     Place PACO hose  Until discontinued      07/18/19 0235     Place sequential compression device  Until discontinued      07/18/19 0235          Fermin Jim MD  Urology  [facility]

## 2019-07-18 NOTE — ASSESSMENT & PLAN NOTE
22 Fr 3-way irrigated with return of a few small clots then was very light pink with small amount of blush. Balloon taken down and continued irrigation then re-inflated to 50 cc. CBI reconnected and turned on slow drip with urine draining clear light pink.    - Admit to urology  - CBI - titrate to clear light pink for now  - regular diet  - restart home meds  - ambulate QID  - PACO/SCDs/GI ppx

## 2019-07-18 NOTE — DISCHARGE INSTRUCTIONS
Please take Levsin as needed for bladder spasms.    We will arrange for you to follow up with us on Monday 7/22/19 for catheter removal and voiding trial.

## 2019-07-18 NOTE — PLAN OF CARE
07/18/19 1138   Post-Acute Status   Post-Acute Authorization Other   Other Status No Post-Acute Service Needs     Possible discharge later this afternoon. No needs. Follow up appt for  removal to be scheduled by clinic staff.    Future Appointments   Date Time Provider Department Center   10/21/2019 11:15 AM Fermin Lozada MD [facility] UROLOGY [place]

## 2019-07-18 NOTE — SUBJECTIVE & OBJECTIVE
Past Medical History:   Diagnosis Date    Atrial fibrillation     BPH with urinary obstruction     Hyperlipemia     Hypertension        Past Surgical History:   Procedure Laterality Date    BACK SURGERY      CARDIAC PACEMAKER PLACEMENT      DESTRUCTION, PROSTATE, TRANSURETHRAL N/A 1/22/2019    Performed by Fermin Lozada MD at [facility] OR 1ST FLR    HERNIA REPAIR      NECK SURGERY      TURP (TRANSURETHRAL RESECTION OF PROSTATE) N/A 7/16/2019    Performed by Fermin Lozada MD at [facility] OR 1ST FLR    VASECTOMY         Review of patient's allergies indicates:   Allergen Reactions    Soma [carisoprodol] Shortness Of Breath    Ace inhibitors Swelling    Carvedilol      Unknown reaction    Enalapril Swelling    Felodipine Swelling    Norvasc [amlodipine] Swelling    Statins-hmg-coa reductase inhibitors Other (See Comments)     Pain, muscle pain       Family History     None          Tobacco Use    Smoking status: Former Smoker     Types: Cigarettes    Smokeless tobacco: Former User   Substance and Sexual Activity    Alcohol use: Yes    Drug use: No    Sexual activity: Not on file       Review of Systems   Constitutional: Negative for activity change, fatigue, fever and unexpected weight change.   HENT: Negative for sore throat and trouble swallowing.    Eyes: Negative for pain and discharge.   Respiratory: Negative for chest tightness and shortness of breath.    Cardiovascular: Negative for chest pain and palpitations.   Gastrointestinal: Positive for constipation. Negative for abdominal pain, diarrhea, nausea and vomiting.   Genitourinary:        As per HPI   Musculoskeletal: Negative for back pain and gait problem.   Skin: Negative for rash and wound.   Neurological: Negative for seizures and numbness.   Psychiatric/Behavioral: Negative for hallucinations. The patient is not nervous/anxious.        Objective:     Temp:  [96.6 °F (35.9 °C)-99 °F (37.2 °C)] 96.6 °F (35.9 °C)  Pulse:  [75-80] 76  Resp:   [17-20] 18  SpO2:  [93 %-98 %] 97 %  BP: (127-165)/(72-82) 156/78     There is no height or weight on file to calculate BMI.           Drains     Drain                 Urethral Catheter 07/18/19 0226 22 Fr. less than 1 day                Physical Exam   Nursing note and vitals reviewed.  Constitutional: He is oriented to person, place, and time. He appears well-developed and well-nourished. No distress.   Cardiovascular: Normal rate.    Pulmonary/Chest: Effort normal. No accessory muscle usage. No respiratory distress.   Abdominal: Soft. He exhibits no distension. There is no tenderness.   Genitourinary:   Genitourinary Comments: 22 3-way  in place CBI connected and clamped, medium red in tubing   Musculoskeletal: He exhibits no edema.   Neurological: He is alert and oriented to person, place, and time.       Significant Labs:    BMP:  Recent Labs   Lab 07/17/19  0428      K 4.4      CO2 27   BUN 10   CREATININE 1.0   CALCIUM 8.4*       CBC:  Recent Labs   Lab 07/17/19 0428   WBC 7.41   HGB 12.7*   HCT 40.0   *       All pertinent labs results from the past 24 hours have been reviewed.    Significant Imaging:  All pertinent imaging results/findings from the past 24 hours have been reviewed.

## 2019-07-18 NOTE — PLAN OF CARE
07/18/19 1413   Final Note   Assessment Type Final Discharge Note   Anticipated Discharge Disposition Home   What phone number can be called within the next 1-3 days to see how you are doing after discharge? [phone]   Hospital Follow Up  Appt(s) scheduled? Yes   Discharge plans and expectations educations in teach back method with documentation complete? Yes   Right Care Referral Info   Post Acute Recommendation No Care     Pt discharge to home w/ catheter. Follow up appt scheduled to remove the catheter in clinic.    Future Appointments   Date Time Provider Department Center   7/22/2019  1:00 PM Tonya Fulton PA-C [facility] UROLOGY [place]   10/21/2019 11:15 AM Fermin Lozada MD [facility] UROLOGY [place]

## 2019-07-18 NOTE — HPI
Lakeshia Luna is a 74 y.o. male who is POD#2 from a bipolar TURP transferred to [facility] for clot retention. The patient underwent TURP on 7/16. On POD#1 his CBI was clamped and his urine remained clear light pink after walking. His  was removed and he was able to void multiple times measuring 1.2 L during the day. Urine character was medium red without clots and satisfactory for discharge. On his way home he began having lower abdominal cramping and sensation of urgency. He went to a nearby ER and had a catheter placed. He is unsure how much was on bladder scan or how much drained initially. He said it required significant irrigation to remove clots and ultimately was cleared and started on CBI. He was transferred to [facility] for further management

## 2019-07-18 NOTE — ASSESSMENT & PLAN NOTE
22 Fr 3-way irrigated with return of a few small clots then was very light pink with small amount of blush. Balloon taken down and continued irrigation then re-inflated to 50 cc. CBI reconnected and turned on slow drip with urine draining clear light pink.    - CBI clamped, will recheck later this AM   - regular diet  - DC IVF   - restart home meds  - ambulate QID  - PACO/SCDs/GI ppx

## 2019-07-18 NOTE — TELEPHONE ENCOUNTER
----- Message from Kaley Tracy MD sent at 7/18/2019  1:52 PM CDT -----  Please have patient follow up with [facility] on Monday 7/22/19 for catheter removal and voiding trial. Thank you.

## 2019-07-22 ENCOUNTER — OFFICE VISIT (OUTPATIENT)
Dept: UROLOGY | Facility: CLINIC | Age: 74
End: 2019-07-22
Payer: MEDICARE

## 2019-07-22 VITALS
DIASTOLIC BLOOD PRESSURE: 83 MMHG | HEIGHT: 73 IN | HEART RATE: 77 BPM | SYSTOLIC BLOOD PRESSURE: 150 MMHG | WEIGHT: 208.13 LBS | BODY MASS INDEX: 27.59 KG/M2

## 2019-07-22 DIAGNOSIS — Z46.6 ENCOUNTER FOR FOLEY CATHETER REMOVAL: Primary | ICD-10-CM

## 2019-07-22 DIAGNOSIS — Z90.79 S/P TURP: ICD-10-CM

## 2019-07-22 PROCEDURE — 99024 PR POST-OP FOLLOW-UP VISIT: ICD-10-PCS | Mod: POP,,, | Performed by: PHYSICIAN ASSISTANT

## 2019-07-22 PROCEDURE — 99214 OFFICE O/P EST MOD 30 MIN: CPT | Mod: PBBFAC | Performed by: PHYSICIAN ASSISTANT

## 2019-07-22 PROCEDURE — 51700 IRRIGATION OF BLADDER: CPT | Mod: S$PBB,58,, | Performed by: PHYSICIAN ASSISTANT

## 2019-07-22 PROCEDURE — 99999 PR PBB SHADOW E&M-EST. PATIENT-LVL IV: CPT | Mod: PBBFAC,,, | Performed by: PHYSICIAN ASSISTANT

## 2019-07-22 PROCEDURE — 51700 PR IRRIGATION, BLADDER: ICD-10-PCS | Mod: S$PBB,58,, | Performed by: PHYSICIAN ASSISTANT

## 2019-07-22 PROCEDURE — 99024 POSTOP FOLLOW-UP VISIT: CPT | Mod: POP,,, | Performed by: PHYSICIAN ASSISTANT

## 2019-07-22 PROCEDURE — 99999 PR PBB SHADOW E&M-EST. PATIENT-LVL IV: ICD-10-PCS | Mod: PBBFAC,,, | Performed by: PHYSICIAN ASSISTANT

## 2019-07-22 PROCEDURE — 51700 IRRIGATION OF BLADDER: CPT | Mod: PBBFAC | Performed by: PHYSICIAN ASSISTANT

## 2019-07-22 NOTE — PROGRESS NOTES
CHIEF COMPLAINT:    Mr. Luna is a 74 y.o. male presenting for voiding trial.  PRESENTING ILLNESS:    Lakeshia Luna is a 74 y.o. male who presents for voiding trial.  The patient underwent TURP on 7/16. On POD#1 his CBI was clamped and his urine remained clear light pink after walking. His  was removed and he was able to void multiple times measuring 1.2 L during the day. Urine character was medium red without clots and satisfactory for discharge. On his way home he began having lower abdominal cramping and sensation of urgency. He went to a nearby ER and had a catheter placed.  He was transferred to [facility] for further management.  His catheter was irrigated to clear.  His urine stayed a very light pink without clots.  He was discharged with the  catheter.    He is here to have his catheter removed.     His catheter has been draining well.  He denies fevers and chills.  He is having good bowel movements.  He reports yellow urine in the bag.          PATIENT HISTORY:    Past Medical History:   Diagnosis Date    Atrial fibrillation     BPH with urinary obstruction     Hyperlipemia     Hypertension        Past Surgical History:   Procedure Laterality Date    BACK SURGERY      CARDIAC PACEMAKER PLACEMENT      DESTRUCTION, PROSTATE, TRANSURETHRAL N/A 1/22/2019    [facility]    HERNIA REPAIR      NECK SURGERY      TURP (TRANSURETHRAL RESECTION OF PROSTATE) N/A 7/16/2019    Performed by Fermin Lozada MD at [facility] OR 1ST FLR    VASECTOMY         History reviewed. No pertinent family history.    Social History     Socioeconomic History    Marital status:      Spouse name: Not on file    Number of children: Not on file    Years of education: Not on file    Highest education level: Not on file   Occupational History    Not on file   Social Needs    Financial resource strain: Not on file    Food insecurity:     Worry: Not on file     Inability: Not on file     Transportation needs:     Medical: Not on file     Non-medical: Not on file   Tobacco Use    Smoking status: Former Smoker     Types: Cigarettes    Smokeless tobacco: Former User   Substance and Sexual Activity    Alcohol use: Yes    Drug use: No    Sexual activity: Not on file   Lifestyle    Physical activity:     Days per week: Not on file     Minutes per session: Not on file    Stress: Not on file   Relationships    Social connections:     Talks on phone: Not on file     Gets together: Not on file     Attends [employer] service: Not on file     Active member of club or organization: Not on file     Attends meetings of clubs or organizations: Not on file     Relationship status: Not on file   Other Topics Concern    Not on file   Social History Narrative    Not on file       Allergies:  Soma [carisoprodol]; Ace inhibitors; Carvedilol; Enalapril; Felodipine; Norvasc [amlodipine]; and Statins-hmg-coa reductase inhibitors    Medications:    Current Outpatient Medications:     amLODIPine (NORVASC) 2.5 MG tablet, Norvasc 2.5 mg tablet  Take 1 tablet every day by oral route., Disp: , Rfl:     aspirin (ECOTRIN) 81 MG EC tablet, Take 81 mg by mouth once daily., Disp: , Rfl:     atorvastatin (LIPITOR) 10 MG tablet, Take 10 mg by mouth every evening. , Disp: , Rfl:     co-enzyme Q-10 50 mg capsule, Take 100 mg by mouth once daily., Disp: , Rfl:     docusate sodium (COLACE) 100 MG capsule, Take 100 mg by mouth 2 (two) times daily., Disp: , Rfl:     furosemide (LASIX) 20 MG tablet, , Disp: , Rfl: 11    hydrALAZINE (APRESOLINE) 25 MG tablet, Take 25 mg by mouth every 8 (eight) hours. , Disp: , Rfl: 11    HYDROcodone-acetaminophen (NORCO) 5-325 mg per tablet, Take 1 tablet by mouth every 6 (six) hours as needed for Pain., Disp: 11 tablet, Rfl: 0    hyoscyamine (ANASPAZ,LEVSIN) 0.125 mg Tab, Take 1 tablet (125 mcg total) by mouth every 4 (four) hours as needed (bladder spasms)., Disp: 30 tablet, Rfl: 0     potassium chloride (MICRO-K) 8 mEq CpSR, Take 8 mEq by mouth once daily. , Disp: , Rfl: 3    tamsulosin (FLOMAX) 0.4 mg Cap, Take 0.4 mg by mouth every evening. , Disp: , Rfl: 6    warfarin (COUMADIN) 5 MG tablet, Take 5 mg by mouth Daily. , Disp: , Rfl: 11    PHYSICAL EXAMINATION:    Constitutional: He appears well-developed and well-nourished.  He is in no apparent distress.    Eyes: No scleral icterus noted bilaterally. No discharge bilaterally.    Nose: No rhinorrhea    Cardiovascular: Normal rate.      Pulmonary/Chest: Effort normal. No respiratory distress.     Abdominal:  He exhibits no distension.      Neurological: He is alert and oriented to person, place, and time.     Psych: Cooperative with normal affect.     catheter in place.  Yellow urine was noted in bag.    IMPRESSION:    Encounter Diagnoses   Name Primary?    Encounter for  catheter removal Yes    S/P TURP          PLAN:    Voiding trial performed by Nurse Mayberry. 180ml of sterile water was instilled into bladder.   catheter was removed. Patient urinated 220ml without difficulty.    Voiding trial passed    I instructed patient to return to clinic or emergency department (if after clinic hours) to have  catheter put back in if unable to urinate within 5 hours of  catheter removal or starts to experience bladder pressure/pain, decrease flow, straining/difficulty urinating, urinary frequency.    Patient voiced understanding.    Follow up with Dr. Lozada in October as scheduled.

## 2019-07-22 NOTE — PATIENT INSTRUCTIONS
Return to clinic or emergency department (if after clinic hours) to have  catheter put back in if unable to urinate within 5 hours of  catheter removal or starts to experience bladder pressure/pain, decrease flow, straining/difficulty urinating, urinary frequency.

## 2019-07-31 ENCOUNTER — NURSE TRIAGE (OUTPATIENT)
Dept: ADMINISTRATIVE | Facility: CLINIC | Age: 74
End: 2019-07-31

## 2019-07-31 ENCOUNTER — TELEPHONE (OUTPATIENT)
Dept: UROLOGY | Facility: CLINIC | Age: 74
End: 2019-07-31

## 2019-07-31 NOTE — TELEPHONE ENCOUNTER
----- Message from Fatou Will sent at 7/31/2019  1:55 PM CDT -----  Contact: Self- [phone]  [place]- pt called to speak with Jessenia- states he doesn't have a lot of energy following procedure on 7/16 and wanted to know if that was ok- please contact pt at [phone]

## 2019-07-31 NOTE — TELEPHONE ENCOUNTER
Spoke to pt. Pt reports feeling weak, fatigued, continues to have small amount of blood in urine, there is no odor present, urine is clear. Pt also reports having low grade temp . Pt is concerned, he does not want to come in for appt. He lives 4 hours away. Asking for labs. Will contact . Instructed pt if he should have increased  symptoms he will need to be evaluated at office. He verbalized understanding.

## 2019-08-01 ENCOUNTER — TELEPHONE (OUTPATIENT)
Dept: UROLOGY | Facility: CLINIC | Age: 74
End: 2019-08-01

## 2019-08-01 NOTE — TELEPHONE ENCOUNTER
"Returned pts call. Pt reports he is feeling 100% better thinks he had a "virus". No temp. No weakness. Pt denies needing appt today. Instructed to call if he had any questions or concerns. He verbalized  udnerstanding.    "

## 2019-08-01 NOTE — TELEPHONE ENCOUNTER
----- Message from Alexey Bowling sent at 8/1/2019 10:31 AM CDT -----  Contact: pt: [phone]  Patient Returning Call from [facility]    Who Left Message for Patient: Jessenia     Communication Preference: pt: [phone]

## 2019-10-21 ENCOUNTER — OFFICE VISIT (OUTPATIENT)
Dept: UROLOGY | Facility: CLINIC | Age: 74
End: 2019-10-21
Payer: MEDICARE

## 2019-10-21 VITALS
HEART RATE: 84 BPM | SYSTOLIC BLOOD PRESSURE: 151 MMHG | WEIGHT: 211.63 LBS | HEIGHT: 73 IN | DIASTOLIC BLOOD PRESSURE: 83 MMHG | BODY MASS INDEX: 28.05 KG/M2

## 2019-10-21 DIAGNOSIS — N13.8 BPH WITH URINARY OBSTRUCTION: Primary | ICD-10-CM

## 2019-10-21 DIAGNOSIS — N40.1 BPH WITH URINARY OBSTRUCTION: Primary | ICD-10-CM

## 2019-10-21 DIAGNOSIS — N52.01 ERECTILE DYSFUNCTION DUE TO ARTERIAL INSUFFICIENCY: ICD-10-CM

## 2019-10-21 PROBLEM — N99.89 POSTOPERATIVE URINARY RETENTION: Status: RESOLVED | Noted: 2019-07-18 | Resolved: 2019-10-21

## 2019-10-21 PROBLEM — R33.8 POSTOPERATIVE URINARY RETENTION: Status: RESOLVED | Noted: 2019-07-18 | Resolved: 2019-10-21

## 2019-10-21 PROCEDURE — 99999 PR PBB SHADOW E&M-EST. PATIENT-LVL III: ICD-10-PCS | Mod: PBBFAC,,, | Performed by: UROLOGY

## 2019-10-21 PROCEDURE — 99214 OFFICE O/P EST MOD 30 MIN: CPT | Mod: S$PBB,,, | Performed by: UROLOGY

## 2019-10-21 PROCEDURE — 99213 OFFICE O/P EST LOW 20 MIN: CPT | Mod: PBBFAC | Performed by: UROLOGY

## 2019-10-21 PROCEDURE — 99999 PR PBB SHADOW E&M-EST. PATIENT-LVL III: CPT | Mod: PBBFAC,,, | Performed by: UROLOGY

## 2019-10-21 PROCEDURE — 99214 PR OFFICE/OUTPT VISIT, EST, LEVL IV, 30-39 MIN: ICD-10-PCS | Mod: S$PBB,,, | Performed by: UROLOGY

## 2019-10-21 RX ORDER — WARFARIN 2.5 MG/1
TABLET ORAL
Refills: 11 | COMMUNITY
Start: 2019-08-21

## 2019-10-21 RX ORDER — HYDRALAZINE HYDROCHLORIDE 50 MG/1
TABLET, FILM COATED ORAL
Refills: 6 | COMMUNITY
Start: 2019-10-14

## 2019-10-21 NOTE — PROGRESS NOTES
CHIEF COMPLAINT:    Mr. Lnua is a 74 y.o. male presenting with LUTS.    PRESENTING ILLNESS:    Lakeshia Luna is a 74 y.o. male who c/o LUTS.   He's s/p rezum on 1/22/19.  His voiding improved, but he was still unsatisfied.  He underwent UDS which was c/w GAONA, so he underwent TURP on 7/16/19.  Path was benign.  He reports that he's voiding much better.  He has a good FOS.  No urgency.  He's very pleased.  No hematuria.  No dysuria.    He also c/o ED.  This has been present for > 1 year.  He's tried and failed oral meds, a MATHEW, and ICI.  He wants an IPP.  However, he has a $4,000 bill for an ambulance ride that he needs to pay off 1st.    REVIEW OF SYSTEMS:    Lakeshia Luna denies headache, blurred vision, fever, nausea, vomiting, chills, abdominal pain, bleeding per rectum, cough, SOB, recent loss of consciousness, recent mental status changes, seizures, dizziness, or upper or lower extremity weakness.    KATIE  1. 1  2. 0  3. 0  4. 0  5. 0     PATIENT HISTORY:    Past Medical History:   Diagnosis Date    Atrial fibrillation     BPH with urinary obstruction     Hyperlipemia     Hypertension        Past Surgical History:   Procedure Laterality Date    BACK SURGERY      CARDIAC PACEMAKER PLACEMENT      HERNIA REPAIR      NECK SURGERY      TRANSURETHRAL RESECTION OF PROSTATE N/A 7/16/2019    Procedure: TURP (TRANSURETHRAL RESECTION OF PROSTATE);  Surgeon: Fermin Lozada MD;  Location: [address];  Service: Urology;  Laterality: N/A;  30mins    VASECTOMY         History reviewed. No pertinent family history.    Social History     Socioeconomic History    Marital status:      Spouse name: Not on file    Number of children: Not on file    Years of education: Not on file    Highest education level: Not on file   Occupational History    Not on file   Social Needs    Financial resource strain: Not on file    Food insecurity:     Worry: Not on file     Inability: Not on file    Transportation needs:      Medical: Not on file     Non-medical: Not on file   Tobacco Use    Smoking status: Former Smoker     Types: Cigarettes    Smokeless tobacco: Former User   Substance and Sexual Activity    Alcohol use: Yes    Drug use: No    Sexual activity: Not on file   Lifestyle    Physical activity:     Days per week: Not on file     Minutes per session: Not on file    Stress: Not on file   Relationships    Social connections:     Talks on phone: Not on file     Gets together: Not on file     Attends [employer] service: Not on file     Active member of club or organization: Not on file     Attends meetings of clubs or organizations: Not on file     Relationship status: Not on file   Other Topics Concern    Not on file   Social History Narrative    Not on file       Allergies:  Soma [carisoprodol]; Ace inhibitors; Carvedilol; Enalapril; Felodipine; Finasteride; Norvasc [amlodipine]; and Statins-hmg-coa reductase inhibitors    Medications:    Current Outpatient Medications:     amLODIPine (NORVASC) 2.5 MG tablet, Norvasc 2.5 mg tablet  Take 1 tablet every day by oral route., Disp: , Rfl:     aspirin (ECOTRIN) 81 MG EC tablet, Take 81 mg by mouth once daily., Disp: , Rfl:     atorvastatin (LIPITOR) 10 MG tablet, Take 10 mg by mouth every evening. , Disp: , Rfl:     co-enzyme Q-10 50 mg capsule, Take 100 mg by mouth once daily., Disp: , Rfl:     docusate sodium (COLACE) 100 MG capsule, Take 100 mg by mouth 2 (two) times daily., Disp: , Rfl:     furosemide (LASIX) 20 MG tablet, , Disp: , Rfl: 11    hydrALAZINE (APRESOLINE) 50 MG tablet, , Disp: , Rfl: 6    potassium chloride (MICRO-K) 8 mEq CpSR, Take 8 mEq by mouth once daily. , Disp: , Rfl: 3    tamsulosin (FLOMAX) 0.4 mg Cap, Take 0.4 mg by mouth every evening. , Disp: , Rfl: 6    warfarin (COUMADIN) 2.5 MG tablet, , Disp: , Rfl: 11    warfarin (COUMADIN) 5 MG tablet, Take 5 mg by mouth Daily. , Disp: , Rfl: 11    hyoscyamine (ANASPAZ,LEVSIN) 0.125 mg Tab,  Take 1 tablet (125 mcg total) by mouth every 4 (four) hours as needed (bladder spasms)., Disp: 30 tablet, Rfl: 0    PHYSICAL EXAMINATION:    The patient generally appears in good health, is appropriately interactive, and is in no apparent distress.     Eyes: anicteric sclerae, moist conjunctivae; no lid-lag; PERRLA     HENT: Atraumatic; oropharynx clear with moist mucous membranes and no mucosal ulcerations;normal hard and soft palate.  No evidence of lymphadenopathy.    Neck: Trachea midline.  No thyromegaly.    Musculoskeletal: No abnormal gait.    Skin: No lesions.    Mental: Cooperative with normal affect.  Is oriented to time, place, and person.    Neuro: Grossly intact.    Chest: Normal inspiratory effort.   No accessory muscles.  No audible wheezes.  Respirations symmetric on inspiration and expiration.    Heart: Regular rhythm.      Abdomen:  Soft, non-tender. No masses or organomegaly. Bladder is not palpable. No evidence of flank discomfort. No evidence of inguinal hernia.    Genitourinary: The penis is not circumcised with no evidence of plaques or induration. The urethral meatus is normal. The testes, epididymides, and cord structures are normal in size and contour bilaterally. The scrotum is normal in size and contour.    Normal anal sphincter tone. No rectal mass.    The prostate is 40 g. Normal landmarks. Lateral sulci. Median furrow intact.  No nodularity or induration. Seminal vesicles are normal.    Extremities: No clubbing, cyanosis, or edema      LABS:    UA dipped negative today  PVR done immediately after voiding by my nurse is 71 cc.   No results found for: PSA, PSADIAG, PSATOTAL, PSAFREE, PSAFREEPCT    IMPRESSION:    Encounter Diagnoses   Name Primary?    BPH with urinary obstruction Yes    Erectile dysfunction due to arterial insufficiency      HTN, controlled  Hyperlipidemia, controlled    PLAN:    1. Will observe his LUTS as they don't bother him.  2. RTC 6 months.  Will discuss IPP then  as he should have the ambulance bill resolved by then.      Copy to:

## 2020-12-11 LAB
ABS NEUT (OLG): 2.62 X10(3)/MCL (ref 2.1–9.2)
ALBUMIN SERPL-MCNC: 4 GM/DL (ref 3.4–4.8)
ALBUMIN/GLOB SERPL: 1.2 RATIO (ref 1.1–2)
ALP SERPL-CCNC: 41 UNIT/L (ref 40–150)
ALT SERPL-CCNC: 18 UNIT/L (ref 0–55)
AST SERPL-CCNC: 21 UNIT/L (ref 5–34)
BASOPHILS # BLD AUTO: 0 X10(3)/MCL (ref 0–0.2)
BASOPHILS NFR BLD AUTO: 0 %
BILIRUB SERPL-MCNC: 1.5 MG/DL
BILIRUBIN DIRECT+TOT PNL SERPL-MCNC: 0.5 MG/DL (ref 0–0.5)
BILIRUBIN DIRECT+TOT PNL SERPL-MCNC: 1 MG/DL (ref 0–0.8)
BUN SERPL-MCNC: 10 MG/DL (ref 8.4–25.7)
CALCIUM SERPL-MCNC: 8 MG/DL (ref 8.8–10)
CHLORIDE SERPL-SCNC: 96 MMOL/L (ref 98–107)
CO2 SERPL-SCNC: 32 MMOL/L (ref 23–31)
CREAT SERPL-MCNC: 0.98 MG/DL (ref 0.73–1.18)
EOSINOPHIL # BLD AUTO: 0 X10(3)/MCL (ref 0–0.9)
EOSINOPHIL NFR BLD AUTO: 1 %
ERYTHROCYTE [DISTWIDTH] IN BLOOD BY AUTOMATED COUNT: 13.6 % (ref 11.5–17)
GLOBULIN SER-MCNC: 3.3 GM/DL (ref 2.4–3.5)
GLUCOSE SERPL-MCNC: 90 MG/DL (ref 82–115)
HCT VFR BLD AUTO: 45.2 % (ref 42–52)
HGB BLD-MCNC: 14.5 GM/DL (ref 14–18)
LYMPHOCYTES # BLD AUTO: 1.7 X10(3)/MCL (ref 0.6–4.6)
LYMPHOCYTES NFR BLD AUTO: 35 %
MCH RBC QN AUTO: 29.8 PG (ref 27–31)
MCHC RBC AUTO-ENTMCNC: 32.1 GM/DL (ref 33–36)
MCV RBC AUTO: 93 FL (ref 80–94)
MONOCYTES # BLD AUTO: 0.6 X10(3)/MCL (ref 0.1–1.3)
MONOCYTES NFR BLD AUTO: 11 %
NEUTROPHILS # BLD AUTO: 2.62 X10(3)/MCL (ref 2.1–9.2)
NEUTROPHILS NFR BLD AUTO: 52 %
PLATELET # BLD AUTO: 152 X10(3)/MCL (ref 130–400)
PMV BLD AUTO: 10.9 FL (ref 9.4–12.4)
POTASSIUM SERPL-SCNC: 3.8 MMOL/L (ref 3.5–5.1)
PROT SERPL-MCNC: 7.3 GM/DL (ref 5.8–7.6)
RBC # BLD AUTO: 4.86 X10(6)/MCL (ref 4.7–6.1)
SODIUM SERPL-SCNC: 139 MMOL/L (ref 136–145)
WBC # SPEC AUTO: 5 X10(3)/MCL (ref 4.5–11.5)

## 2020-12-15 ENCOUNTER — HISTORICAL (OUTPATIENT)
Dept: ADMINISTRATIVE | Facility: HOSPITAL | Age: 75
End: 2020-12-15

## 2020-12-15 LAB
INR PPP: 1.1 (ref 0–1.3)
PROTHROMBIN TIME: 13.5 SECOND(S) (ref 11.1–13.7)

## 2021-05-21 LAB
ALBUMIN SERPL-MCNC: 3.8 GM/DL (ref 3.4–4.8)
ALBUMIN/GLOB SERPL: 1.3 RATIO (ref 1.1–2)
ALP SERPL-CCNC: 47 UNIT/L (ref 40–150)
ALT SERPL-CCNC: 14 UNIT/L (ref 0–55)
AST SERPL-CCNC: 15 UNIT/L (ref 5–34)
BILIRUB SERPL-MCNC: 1.3 MG/DL
BILIRUBIN DIRECT+TOT PNL SERPL-MCNC: 0.4 MG/DL (ref 0–0.5)
BILIRUBIN DIRECT+TOT PNL SERPL-MCNC: 0.9 MG/DL (ref 0–0.8)
BUN SERPL-MCNC: 17.5 MG/DL (ref 8.4–25.7)
CALCIUM SERPL-MCNC: 8.6 MG/DL (ref 8.8–10)
CHLORIDE SERPL-SCNC: 94 MMOL/L (ref 98–107)
CO2 SERPL-SCNC: 31 MMOL/L (ref 23–31)
CREAT SERPL-MCNC: 1.04 MG/DL (ref 0.73–1.18)
ERYTHROCYTE [DISTWIDTH] IN BLOOD BY AUTOMATED COUNT: 14.6 % (ref 11.5–17)
GLOBULIN SER-MCNC: 3 GM/DL (ref 2.4–3.5)
GLUCOSE SERPL-MCNC: 70 MG/DL (ref 82–115)
HCT VFR BLD AUTO: 45.2 % (ref 42–52)
HGB BLD-MCNC: 14.5 GM/DL (ref 14–18)
MCH RBC QN AUTO: 29.1 PG (ref 27–31)
MCHC RBC AUTO-ENTMCNC: 32.1 GM/DL (ref 33–36)
MCV RBC AUTO: 90.8 FL (ref 80–94)
PLATELET # BLD AUTO: 144 X10(3)/MCL (ref 130–400)
PMV BLD AUTO: 11.5 FL (ref 9.4–12.4)
POTASSIUM SERPL-SCNC: 4 MMOL/L (ref 3.5–5.1)
PROT SERPL-MCNC: 6.8 GM/DL (ref 5.8–7.6)
RBC # BLD AUTO: 4.98 X10(6)/MCL (ref 4.7–6.1)
SARS-COV-2 RNA RESP QL NAA+PROBE: NOT DETECTED
SODIUM SERPL-SCNC: 137 MMOL/L (ref 136–145)
WBC # SPEC AUTO: 7 X10(3)/MCL (ref 4.5–11.5)

## 2021-05-25 ENCOUNTER — HISTORICAL (OUTPATIENT)
Dept: ADMINISTRATIVE | Facility: HOSPITAL | Age: 76
End: 2021-05-25

## 2021-05-25 LAB
INR PPP: 1.2 (ref 0–1.3)
PROTHROMBIN TIME: 14.5 SECOND(S) (ref 11.1–13.7)

## 2022-04-11 ENCOUNTER — HISTORICAL (OUTPATIENT)
Dept: ADMINISTRATIVE | Facility: HOSPITAL | Age: 77
End: 2022-04-11
Payer: MEDICARE

## 2022-04-24 VITALS — BODY MASS INDEX: 28.05 KG/M2 | HEIGHT: 73 IN | WEIGHT: 211.63 LBS

## 2022-04-29 NOTE — OP NOTE
DATE OF SURGERY:    05/25/2021    SURGEON:  Kristian Mendoza MD    PREOPERATIVE DIAGNOSIS:  Malpositioned inflatable penile prosthesis pump.    POSTOPERATIVE DIAGNOSIS:  Malpositioned inflatable penile prosthesis pump.    PROCEDURES:    1. Scrotal exploration.  2. Reposition of penile prosthesis pump.    DESCRIPTION OF PROCEDURE:  After informed consent was obtained, the patient was taken to the operating room.  After receiving preoperative doses of antibiotics, he was given a general anesthetic.  In supine position, his abdomen, genitals, perineum, and lower extremities above the knees were all prepped and draped in the usual sterile fashion.  Using a #15 blade, I made a horizontal incision through his previous penoscrotal incision.  I divided the scrotal layers down to the pseudocapsular space of the penile prosthesis pump.  I delivered the pump from the pseudocapsular space.  I found the space in the anterior scrotum just to the left of the midline.  I placed the pump in this location, and the tubing was curving around.  I buried the tubing underneath part of the old pseudocapsular space with interrupted 2-0 Vicryl, and I closed the dartos fascia with a running 2-0 Vicryl suture followed by a running 4-0 Monocryl subcuticular stitch for the skin.  I did use copious amounts of rifampin and gentamicin irrigation.  He tolerated the procedure well.  There were no complications.  He was extubated and brought to the recovery room in good condition.        ______________________________  Kristian Mendoza MD    WBR/US  DD:  05/25/2021  Time:  09:31AM  DT:  05/25/2021  Time:  09:44AM  Job #:  [id number]

## 2022-04-29 NOTE — OP NOTE
DATE OF SURGERY:    12/15/2020    SURGEON:  Kristian Mendoza MD    PREOPERATIVE DIAGNOSIS:  Erectile dysfunction.    POSTOPERATIVE DIAGNOSIS:  Erectile dysfunction.    PROCEDURE:  Inflatable penile prosthesis with Coloplast Titan device.    FINDINGS:  Corporal measurements were 12 cm distally and 11 cm proximally for a total length of 23 cm.  I chose 22 cm cylinders with 1 cm rear-tip extender and a 125 cc reservoir.    DESCRIPTION OF PROCEDURE:  After informed consent was obtained, the patient was taken to the operating room after receiving a preoperative dose of vancomycin and gentamicin.  He was given a general anesthetic in supine position.  His abdomen, genitals, perineum, and lower extremities above the knees were all prepped and draped in usual sterile fashion.  I placed a 16-Greenlandic Resendez catheter through the urethra into the bladder.  I used a Kyle deep scrotal retractor to place tension on the penis.  I used a #15 blade to make a horizontal penoscrotal incision.  I divided the scrotal layers down to the corporal bodies as well as to expose the urethra to prevent any injury.  I placed stay sutures of 2-0 Vicryl on each side of the midline of the corporal bodies at the level of the penoscrotal junction.  I then used a #15 blade to make corporotomies at this level.  I made a first pass through the corpora all the way to the glans penis with curved Metzenbaum scissors.  I then used the measuring device to dilate the corpora proximally and distally.  Distally, it measured 12 cm and proximally measured 11 cm for a total of 23 cm.  I chose 22 cm Titan cylinders that were appropriately prepped on the back table and soaked in concentrated rifampin and gentamicin.  I placed 1 cm rear-tip extenders on the proximal ends.  I used the Shayy  to pass the needle and strings through the distal corpora and glans on both sides.  After this was completed, I placed the proximal ends of the cylinders into  place and pulled the distal ends into place with the strings.  I used my previously placed stay sutures to close the corporotomies.  At this time, the suction was used to empty the bladder through the Resendez catheter.  I prepped a 125 cc reservoir on the back table and soaked it in concentrated rifampin and gentamicin.  I used a curved Radha clamp to perforate the transversalis fascia and I created an extraperitoneal space for the reservoir to be placed.  After the reservoir was placed, it was filled with 125 cc of normal saline.  Using the quick connectors, I connected the reservoir to the scrotal pump which was placed in the dependent portion of the scrotum.  I placed a #15 Anton drain in the scrotum and left exiting the left lower quadrant.  Following this, I closed the scrotum with a running 2-0 Vicryl suture followed by Monocryl 4-0 subcuticular stitch for the skin.  I secured the drain in place with a 2-0 silk     suture.  He tolerated the procedure well.  There were no complications.  Estimated blood loss was 20 cc.  He was extubated and brought to the recovery room in good condition.        ______________________________  MD DANIELA Casey/UA  DD:  12/15/2020  Time:  09:50AM  DT:  12/15/2020  Time:  10:05AM  Job #:  [id number]

## 2024-05-03 NOTE — INTERVAL H&P NOTE
Physical Therapy        Physical Therapy Cancel Note      DATE: 5/3/2024    NAME: Nik Shepherd  MRN: [id number]   : 1952      Patient not seen this date for Physical Therapy due to:    Patient Declined: Attempted treatment. Pt refused. Pt educated on importance of getting up and working with therapy. Pt continues to refuse. Will continue to pursue as able.       Electronically signed by Kacey Powell PTA on 5/3/2024 at 10:01 AM      The patient has been examined and the H&P has been reviewed:    I concur with the findings and no changes have occurred since H&P was written.    Anesthesia/Surgery risks, benefits and alternative options discussed and understood by patient/family.          Active Hospital Problems    Diagnosis  POA    BPH with urinary obstruction [N40.1, N13.8]  Yes      Resolved Hospital Problems   No resolved problems to display.

## 2025-02-19 DIAGNOSIS — M54.12 CERVICAL RADICULITIS: Primary | ICD-10-CM

## 2025-02-25 ENCOUNTER — HOSPITAL ENCOUNTER (OUTPATIENT)
Dept: RADIOLOGY | Facility: HOSPITAL | Age: 80
Discharge: HOME | End: 2025-02-25
Attending: NURSE PRACTITIONER
Payer: MEDICARE

## 2025-02-25 VITALS
TEMPERATURE: 98 F | DIASTOLIC BLOOD PRESSURE: 74 MMHG | BODY MASS INDEX: 25.8 KG/M2 | WEIGHT: 201.06 LBS | HEIGHT: 74 IN | SYSTOLIC BLOOD PRESSURE: 135 MMHG | HEART RATE: 75 BPM | OXYGEN SATURATION: 98 %

## 2025-02-25 DIAGNOSIS — M54.12 CERVICAL RADICULITIS: ICD-10-CM

## 2025-02-25 LAB
POC PTINR: 1.9 (ref 0.9–1.2)
SAMPLE: ABNORMAL

## 2025-02-25 PROCEDURE — 72126 CT NECK SPINE W/DYE: CPT | Mod: TC

## 2025-02-25 PROCEDURE — 25000200 PHARM REV CODE 250 W HCPCS

## 2025-02-25 PROCEDURE — 62302 MYELOGRAPHY LUMBAR INJECTION: CPT

## 2025-02-25 PROCEDURE — 25000003 PHARM REV CODE 250: Performed by: RADIOLOGY

## 2025-02-25 RX ORDER — LIDOCAINE AND PRILOCAINE 25; 25 MG/G; MG/G
CREAM TOPICAL
Status: COMPLETED | OUTPATIENT
Start: 2025-02-25 | End: 2025-02-25

## 2025-02-25 RX ORDER — HYDROCODONE BITARTRATE AND ACETAMINOPHEN 7.5; 325 MG/1; MG/1
1 TABLET ORAL EVERY 6 HOURS PRN
COMMUNITY

## 2025-02-25 RX ORDER — CHOLECALCIFEROL (VITAMIN D3) 25 MCG
1000 TABLET ORAL DAILY
COMMUNITY

## 2025-02-25 RX ORDER — IOPAMIDOL 408 MG/ML
20 INJECTION, SOLUTION INTRAVASCULAR
Status: COMPLETED | OUTPATIENT
Start: 2025-02-25 | End: 2025-02-25

## 2025-02-25 RX ORDER — ATORVASTATIN CALCIUM 40 MG/1
40 TABLET, FILM COATED ORAL DAILY
COMMUNITY

## 2025-02-25 RX ORDER — HYDROCHLOROTHIAZIDE 25 MG/1
25 TABLET ORAL DAILY
COMMUNITY

## 2025-02-25 RX ORDER — FOLIC ACID 0.8 MG
800 TABLET ORAL DAILY
COMMUNITY

## 2025-02-25 RX ORDER — DIAZEPAM 5 MG/1
10 TABLET ORAL
Status: COMPLETED | OUTPATIENT
Start: 2025-02-25 | End: 2025-02-25

## 2025-02-25 RX ORDER — UBIDECARENONE 100 MG
100 CAPSULE ORAL DAILY
COMMUNITY

## 2025-02-25 RX ADMIN — IOPAMIDOL 20 ML: 408 INJECTION, SOLUTION INTRAVASCULAR at 10:02

## 2025-02-25 RX ADMIN — DIAZEPAM 10 MG: 5 TABLET ORAL at 09:02

## 2025-02-25 RX ADMIN — LIDOCAINE AND PRILOCAINE: 25; 25 CREAM TOPICAL at 09:02

## 2025-02-25 NOTE — DISCHARGE INSTRUCTIONS
Keep bandaid clean and dry for 24 hours  Remove bandaid tomorrow, shower with antibacterial soap and water; do not put anything else over puncture site (ex: powders, lotions, creams)  Do not submerge the incision in water for 5 days.   Do not lift anything heavier than gallon of milk for 5 days.    When do I need to call the doctor?   Signs of infection. These include a fever of 100.4°F (38°C) or higher, chills, or wound that will not heal.  Signs of wound infection. These include swelling, redness, warmth around the wound; too much pain when touched; yellowish, greenish, or bloody discharge; foul smell coming from the cut site; cut site opens up.

## (undated) DEVICE — GLOVE BIOGEL 7.5

## (undated) DEVICE — SET SINGLE BASIN

## (undated) DEVICE — SOL NACL IRR 3000ML

## (undated) DEVICE — GEL LUBE ENDOGLIDE 1.1OZ

## (undated) DEVICE — TRAY CYSTO BASIN

## (undated) DEVICE — PACK CYSTO

## (undated) DEVICE — SOL IRR NACL .9% 3000ML

## (undated) DEVICE — SYR 10CC LUER LOCK

## (undated) DEVICE — BAG URINARY DRAINAGE 2000ML

## (undated) DEVICE — IRRIGATION SET Y-TYPE TUR/BLAD

## (undated) DEVICE — HOLDER CATH IAB ADH STATLOCK

## (undated) DEVICE — SYR 50CC LL

## (undated) DEVICE — Device

## (undated) DEVICE — SYR 50ML CATH TIP

## (undated) DEVICE — ELECTRODE LOOP CUTTING BIPOLAR

## (undated) DEVICE — GOWN SMARTGOWN LVL4 X-LONG XL

## (undated) DEVICE — SYS DELIVERY REZUM

## (undated) DEVICE — SOL NACL IRR 1000ML BTL